# Patient Record
Sex: FEMALE | Race: WHITE | Employment: STUDENT | URBAN - METROPOLITAN AREA
[De-identification: names, ages, dates, MRNs, and addresses within clinical notes are randomized per-mention and may not be internally consistent; named-entity substitution may affect disease eponyms.]

---
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
## 2018-01-04 ENCOUNTER — OFFICE VISIT (OUTPATIENT)
Dept: FAMILY MEDICINE | Facility: CLINIC | Age: 18
End: 2018-01-04
Payer: COMMERCIAL

## 2018-01-04 VITALS
SYSTOLIC BLOOD PRESSURE: 124 MMHG | WEIGHT: 137.8 LBS | HEART RATE: 83 BPM | BODY MASS INDEX: 19.73 KG/M2 | HEIGHT: 70 IN | DIASTOLIC BLOOD PRESSURE: 83 MMHG

## 2018-01-04 DIAGNOSIS — F43.21 ADJUSTMENT DISORDER WITH DEPRESSED MOOD: Primary | ICD-10-CM

## 2018-01-04 ASSESSMENT — ENCOUNTER SYMPTOMS
NERVOUS/ANXIOUS: 1
CONSTITUTIONAL NEGATIVE: 1
INSOMNIA: 1
GASTROINTESTINAL NEGATIVE: 1
RESPIRATORY NEGATIVE: 1
DEPRESSION: 1
CARDIOVASCULAR NEGATIVE: 1
MEMORY LOSS: 0
HALLUCINATIONS: 0

## 2018-01-04 ASSESSMENT — LIFESTYLE VARIABLES: SUBSTANCE_ABUSE: 0

## 2018-01-04 NOTE — PROGRESS NOTES
HPI  In for f/u of depression and cutting.  Doing well.  Home was much better over the holidays.  Feels good getting back to school.  Connected with sports psych which has gone well.  Meds are good with no side effects.  No SI or other cutting episodes.  Did break up with boyfriend.  No other new concerns.    Review of Systems   Constitutional: Negative.    HENT: Negative.    Respiratory: Negative.    Cardiovascular: Negative.    Gastrointestinal: Negative.    Genitourinary: Negative.    Skin: Negative.    Psychiatric/Behavioral: Positive for depression. Negative for hallucinations, memory loss, substance abuse and suicidal ideas. The patient is nervous/anxious and has insomnia.          Physical Exam   Constitutional: She is oriented to person, place, and time and well-developed, well-nourished, and in no distress. No distress.   Neck: Normal range of motion. Neck supple.   Cardiovascular: Normal rate and regular rhythm.    No murmur heard.  Pulmonary/Chest: Effort normal and breath sounds normal.   Neurological: She is alert and oriented to person, place, and time.   Skin: She is not diaphoretic.   Psychiatric: Memory, affect and judgment normal.       Depression  -doing better   -still need to follow very closely on her return to school/sport  -consents to safety and plan going forward  -cont counseling and sports psych visit  -cont meds  -f/u in 1-2 weeks sooner if any concerns

## 2018-01-04 NOTE — MR AVS SNAPSHOT
"              After Visit Summary   2018    Ana Bright    MRN: [id number]           Patient Information     Date Of Birth          2000        Visit Information        Provider Department      2018 11:30 AM Bubba Brown MD [facility]        Today's Diagnoses     Adjustment disorder with depressed mood    -  1       Follow-ups after your visit        Who to contact     Please call your clinic at [phone] to:    Ask questions about your health    Make or cancel appointments    Discuss your medicines    Learn about your test results    Speak to your doctor   If you have compliments or concerns about an experience at your clinic, or if you wish to file a complaint, please contact [facility] Patient Relations at [phone] or email us at [email or web address]         Additional Information About Your Visit        MyChart Information     Filecubed is an electronic gateway that provides easy, online access to your medical records. With Filecubed, you can request a clinic appointment, read your test results, renew a prescription or communicate with your care team.     To sign up for DealCloudt visit the website at [email or web address]   You will be asked to enter the access code listed below, as well as some personal information. Please follow the directions to create your username and password.     Your access code is: [email or web address]  Expires: 2018  3:57 PM     Your access code will  in 90 days. If you need help or a new code, please contact your [facility] or call [phone] for assistance.        Care EveryWhere ID     This is your Care EveryWhere ID. This could be used by other organizations to access your [place] medical records  [id number]        Your Vitals Were     Pulse Height Last Period BMI (Body Mass Index)          83 1.778 m (5' 10\") 2017 19.77 kg/m2         Blood Pressure from " Last 3 Encounters:   01/04/18 124/83   12/14/17 118/74   11/02/17 119/71    Weight from Last 3 Encounters:   01/04/18 62.5 kg (137 lb 12.8 oz) (66 %)*   11/02/17 65.2 kg (143 lb 12.8 oz) (74 %)*   10/25/17 64.4 kg (142 lb) (72 %)*     * Growth percentiles are based on [facility] 2-20 Years data.              Today, you had the following     No orders found for display         Today's Medication Changes          These changes are accurate as of: 1/4/18  3:57 PM.  If you have any questions, ask your nurse or doctor.               Start taking these medicines.        Dose/Directions    sertraline 50 MG tablet   Commonly known as:  ZOLOFT   Used for:  Adjustment disorder with depressed mood        Dose:  50 mg   Take 1 tablet (50 mg) by mouth daily   Quantity:  30 tablet   Refills:  1            Where to get your medicines      These medications were sent to [place] [address] [place] [address]   [address] [facility] [address]     Phone:  [phone]     sertraline 50 MG tablet                Primary Care Provider    None Specified       No primary provider on file.        Equal Access to Services     [facility]: Haddione Herrera, andrews walters, en davalosalmada laine, clary conklin. So [facility] [phone].    ATENCIÓN: Si habla español, tiene a diaz disposición servicios gratuitos de asistencia lingüística. Llame al [phone].    We comply with applicable federal civil rights laws and [place] laws. We do not discriminate on the basis of race, color, national origin, age, disability, sex, sexual orientation, or gender identity.            Thank you!     Thank you for choosing [facility]  for your care. Our goal is always to provide you with excellent care. Hearing back from our patients is one way we can continue to improve our services. Please take a few minutes to complete the written survey that you may receive in the mail  after your visit with us. Thank you!             Your Updated Medication List - Protect others around you: Learn how to safely use, store and throw away your medicines at www.disposemymeds.org.          This list is accurate as of: 1/4/18  3:57 PM.  Always use your most recent med list.                   Brand Name Dispense Instructions for use Diagnosis    albuterol 108 (90 BASE) MCG/ACT Inhaler    VENTOLIN HFA    1 Inhaler    Inhale 2 puffs into the lungs every 6 hours    Moderate asthma with exacerbation, unspecified whether persistent       fluticasone-salmeterol 250-50 MCG/DOSE diskus inhaler    ADVAIR DISKUS    1 Inhaler    Inhale 1 puff into the lungs every 12 hours    Asthma exacerbation       guaiFENesin-dextromethorphan 100-10 MG/5ML syrup    ROBITUSSIN DM    560 mL    Take 5 mLs by mouth every 4 hours as needed for cough    Exercise-induced asthma, Acute bronchitis, unspecified organism       IRON SUPPLEMENT PO           predniSONE 20 MG tablet    DELTASONE    14 tablet    Take 2 tablets (40 mg) by mouth daily    Mild intermittent asthma with exacerbation       sertraline 50 MG tablet    ZOLOFT    30 tablet    Take 1 tablet (50 mg) by mouth daily    Adjustment disorder with depressed mood       Spacer/Aero Chamber Mouthpiece Misc     1 each    One spacer for use with inhaled asthma MDI    Mild intermittent asthma with exacerbation

## 2018-01-04 NOTE — LETTER
Date:January 5, 2018      Patient was self referred, no letter generated. Do not send.        [facility] Health Information

## 2018-01-04 NOTE — LETTER
1/4/2018      RE: Ana Bright  [address]  [place] [address]       HPI  In for f/u of depression and cutting.  Doing well.  Home was much better over the holidays.  Feels good getting back to school.  Connected with sports psych which has gone well.  Meds are good with no side effects.  No SI or other cutting episodes.  Did break up with boyfriend.  No other new concerns.    Review of Systems   Constitutional: Negative.    HENT: Negative.    Respiratory: Negative.    Cardiovascular: Negative.    Gastrointestinal: Negative.    Genitourinary: Negative.    Skin: Negative.    Psychiatric/Behavioral: Positive for depression. Negative for hallucinations, memory loss, substance abuse and suicidal ideas. The patient is nervous/anxious and has insomnia.          Physical Exam   Constitutional: She is oriented to person, place, and time and well-developed, well-nourished, and in no distress. No distress.   Neck: Normal range of motion. Neck supple.   Cardiovascular: Normal rate and regular rhythm.    No murmur heard.  Pulmonary/Chest: Effort normal and breath sounds normal.   Neurological: She is alert and oriented to person, place, and time.   Skin: She is not diaphoretic.   Psychiatric: Memory, affect and judgment normal.       Depression  -doing better   -still need to follow very closely on her return to school/sport  -consents to safety and plan going forward  -cont counseling and sports psych visit  -cont meds  -f/u in 1-2 weeks sooner if any concerns    Bubba Brown MD

## 2018-01-11 ENCOUNTER — OFFICE VISIT (OUTPATIENT)
Dept: FAMILY MEDICINE | Facility: CLINIC | Age: 18
End: 2018-01-11
Payer: COMMERCIAL

## 2018-01-11 VITALS
DIASTOLIC BLOOD PRESSURE: 78 MMHG | SYSTOLIC BLOOD PRESSURE: 122 MMHG | HEART RATE: 57 BPM | WEIGHT: 139.6 LBS | BODY MASS INDEX: 19.98 KG/M2 | HEIGHT: 70 IN

## 2018-01-11 DIAGNOSIS — F43.21 ADJUSTMENT DISORDER WITH DEPRESSED MOOD: Primary | ICD-10-CM

## 2018-01-11 RX ORDER — SERTRALINE HYDROCHLORIDE 100 MG/1
100 TABLET, FILM COATED ORAL DAILY
Qty: 30 TABLET | Refills: 3 | Status: SHIPPED | OUTPATIENT
Start: 2018-01-11 | End: 2020-02-05

## 2018-01-11 ASSESSMENT — ENCOUNTER SYMPTOMS
RESPIRATORY NEGATIVE: 1
DEPRESSION: 1
NERVOUS/ANXIOUS: 1
CONSTITUTIONAL NEGATIVE: 1
NEUROLOGICAL NEGATIVE: 1
INSOMNIA: 0
HALLUCINATIONS: 0
CARDIOVASCULAR NEGATIVE: 1

## 2018-01-11 ASSESSMENT — LIFESTYLE VARIABLES: SUBSTANCE_ABUSE: 0

## 2018-01-11 NOTE — PROGRESS NOTES
HPI  In for f/u.  Doing well.  Feels better overall.  Has discussed increasing her zoloft to 100 mg qd.  No side effects or concerns.  NO other issues.  No SI.    Review of Systems   Constitutional: Negative.    Respiratory: Negative.    Cardiovascular: Negative.    Neurological: Negative.    Psychiatric/Behavioral: Positive for depression. Negative for hallucinations, substance abuse and suicidal ideas. The patient is nervous/anxious. The patient does not have insomnia.          Physical Exam   Constitutional: She is oriented to person, place, and time and well-developed, well-nourished, and in no distress.   Neurological: She is alert and oriented to person, place, and time.   Skin: She is not diaphoretic.   Psychiatric: Mood, memory, affect and judgment normal.       Depression  -will increase zoloft to 100 mg qd  -cont f/u with Sports Psych, Dr. JUNIOR and us

## 2018-01-11 NOTE — LETTER
Date:January 12, 2018      Patient was self referred, no letter generated. Do not send.        [facility] Health Information

## 2018-01-11 NOTE — LETTER
1/11/2018      RE: Ana Bright  [address]  [place] [address]       HPI  In for f/u.  Doing well.  Feels better overall.  Has discussed increasing her zoloft to 100 mg qd.  No side effects or concerns.  NO other issues.  No SI.    Review of Systems   Constitutional: Negative.    Respiratory: Negative.    Cardiovascular: Negative.    Neurological: Negative.    Psychiatric/Behavioral: Positive for depression. Negative for hallucinations, substance abuse and suicidal ideas. The patient is nervous/anxious. The patient does not have insomnia.          Physical Exam   Constitutional: She is oriented to person, place, and time and well-developed, well-nourished, and in no distress.   Neurological: She is alert and oriented to person, place, and time.   Skin: She is not diaphoretic.   Psychiatric: Mood, memory, affect and judgment normal.       Depression  -will increase zoloft to 100 mg qd  -cont f/u with Sports Psych, Dr. JUNIOR and us      Bubba Brown MD

## 2018-01-11 NOTE — MR AVS SNAPSHOT
"              After Visit Summary   2018    Ana Bright    MRN: [id number]           Patient Information     Date Of Birth          2000        Visit Information        Provider Department      2018 11:15 AM Bubba Brown MD [facility]        Today's Diagnoses     Adjustment disorder with depressed mood    -  1       Follow-ups after your visit        Who to contact     Please call your clinic at [phone] to:    Ask questions about your health    Make or cancel appointments    Discuss your medicines    Learn about your test results    Speak to your doctor   If you have compliments or concerns about an experience at your clinic, or if you wish to file a complaint, please contact [facility] Patient Relations at [phone] or email us at [email or web address]         Additional Information About Your Visit        MyChart Information     [employer] is an electronic gateway that provides easy, online access to your medical records. With [employer], you can request a clinic appointment, read your test results, renew a prescription or communicate with your care team.     To sign up for Idibont visit the website at [email or web address]   You will be asked to enter the access code listed below, as well as some personal information. Please follow the directions to create your username and password.     Your access code is: [email or web address]  Expires: 2018  3:57 PM     Your access code will  in 90 days. If you need help or a new code, please contact your [facility] or call [phone] for assistance.        Care EveryWhere ID     This is your Care EveryWhere ID. This could be used by other organizations to access your [place] medical records  [id number]        Your Vitals Were     Pulse Height Last Period BMI (Body Mass Index)          57 1.778 m (5' 10\") 2017 20.03 kg/m2         Blood Pressure from " Last 3 Encounters:   01/11/18 122/78   01/04/18 124/83   12/14/17 118/74    Weight from Last 3 Encounters:   01/11/18 63.3 kg (139 lb 9.6 oz) (68 %)*   01/04/18 62.5 kg (137 lb 12.8 oz) (66 %)*   11/02/17 65.2 kg (143 lb 12.8 oz) (74 %)*     * Growth percentiles are based on [facility] 2-20 Years data.              Today, you had the following     No orders found for display         Today's Medication Changes          These changes are accurate as of: 1/11/18 11:49 AM.  If you have any questions, ask your nurse or doctor.               These medicines have changed or have updated prescriptions.        Dose/Directions    * sertraline 50 MG tablet   Commonly known as:  ZOLOFT   This may have changed:  Another medication with the same name was added. Make sure you understand how and when to take each.   Used for:  Adjustment disorder with depressed mood        Dose:  50 mg   Take 1 tablet (50 mg) by mouth daily   Quantity:  30 tablet   Refills:  1       * sertraline 100 MG tablet   Commonly known as:  ZOLOFT   This may have changed:  You were already taking a medication with the same name, and this prescription was added. Make sure you understand how and when to take each.   Used for:  Adjustment disorder with depressed mood        Dose:  100 mg   Take 1 tablet (100 mg) by mouth daily   Quantity:  30 tablet   Refills:  3       * Notice:  This list has 2 medication(s) that are the same as other medications prescribed for you. Read the directions carefully, and ask your doctor or other care provider to review them with you.         Where to get your medicines      These medications were sent to [facility] [address] [place], [place] - [address] [place] [address]  [address] [facility] [address]     Phone:  [phone]     sertraline 100 MG tablet                Primary Care Provider    None Specified       No primary provider on file.        Equal Access to Services     MICHAEL MORENO AH: andrews Molina  romeo radhazafar davalosclary newberry. So [facility] [phone].    ATENCIÓN: Si haroldo dahl, tiene a diaz disposición servicios gratuitos de asistencia lingüística. Tank al [phone].    We comply with applicable federal civil rights laws and [place] laws. We do not discriminate on the basis of race, color, national origin, age, disability, sex, sexual orientation, or gender identity.            Thank you!     Thank you for choosing [facility]  for your care. Our goal is always to provide you with excellent care. Hearing back from our patients is one way we can continue to improve our services. Please take a few minutes to complete the written survey that you may receive in the mail after your visit with us. Thank you!             Your Updated Medication List - Protect others around you: Learn how to safely use, store and throw away your medicines at www.disposemymeds.org.          This list is accurate as of: 1/11/18 11:49 AM.  Always use your most recent med list.                   Brand Name Dispense Instructions for use Diagnosis    albuterol 108 (90 BASE) MCG/ACT Inhaler    VENTOLIN HFA    1 Inhaler    Inhale 2 puffs into the lungs every 6 hours    Moderate asthma with exacerbation, unspecified whether persistent       fluticasone-salmeterol 250-50 MCG/DOSE diskus inhaler    ADVAIR DISKUS    1 Inhaler    Inhale 1 puff into the lungs every 12 hours    Asthma exacerbation       guaiFENesin-dextromethorphan 100-10 MG/5ML syrup    ROBITUSSIN DM    560 mL    Take 5 mLs by mouth every 4 hours as needed for cough    Exercise-induced asthma, Acute bronchitis, unspecified organism       IRON SUPPLEMENT PO           predniSONE 20 MG tablet    DELTASONE    14 tablet    Take 2 tablets (40 mg) by mouth daily    Mild intermittent asthma with exacerbation       * sertraline 50 MG tablet    ZOLOFT    30 tablet    Take 1 tablet (50 mg) by mouth daily    Adjustment  disorder with depressed mood       * sertraline 100 MG tablet    ZOLOFT    30 tablet    Take 1 tablet (100 mg) by mouth daily    Adjustment disorder with depressed mood       Spacer/Aero Chamber Mouthpiece Misc     1 each    One spacer for use with inhaled asthma MDI    Mild intermittent asthma with exacerbation       * Notice:  This list has 2 medication(s) that are the same as other medications prescribed for you. Read the directions carefully, and ask your doctor or other care provider to review them with you.

## 2018-01-24 ENCOUNTER — RADIANT APPOINTMENT (OUTPATIENT)
Dept: GENERAL RADIOLOGY | Facility: CLINIC | Age: 18
End: 2018-01-24
Attending: FAMILY MEDICINE
Payer: COMMERCIAL

## 2018-01-24 ENCOUNTER — OFFICE VISIT (OUTPATIENT)
Dept: ORTHOPEDICS | Facility: CLINIC | Age: 18
End: 2018-01-24
Payer: COMMERCIAL

## 2018-01-24 ENCOUNTER — RADIANT APPOINTMENT (OUTPATIENT)
Dept: MRI IMAGING | Facility: CLINIC | Age: 18
End: 2018-01-24
Attending: FAMILY MEDICINE
Payer: COMMERCIAL

## 2018-01-24 VITALS — WEIGHT: 139 LBS | HEIGHT: 70 IN | BODY MASS INDEX: 19.9 KG/M2 | RESPIRATION RATE: 16 BRPM

## 2018-01-24 DIAGNOSIS — M54.2 CERVICALGIA: ICD-10-CM

## 2018-01-24 DIAGNOSIS — M54.2 CERVICALGIA: Primary | ICD-10-CM

## 2018-01-24 NOTE — LETTER
[facility] SPORTS [facility]IC[facility]  [address] [address]  Phone: [phone]  Fax: [phone]    January 24, 2018        Aan Bright  [address] [place] [address]          To whom it may concern:    RE: Ana Bright    Patient was seen and treated today at our clinic and missed school. Given the critical nature of her issue she was required to be seen and evaluated right away.     Please contact me for questions or concerns.      Sincerely,        Josh Willingham MD  Primary Care Sports Medicine Fellow  January 24, 2018

## 2018-01-24 NOTE — LETTER
1/24/2018      RE: Ana Bright  [address]  [place] [address]        Subjective:   Ana Bright is a 17 year old female who is here complaining of neck/ upper back pain from a fall in practice on 1/23/18. She is complaining of a headache denies numbness/tining in UE.     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 She was recorded during the jump in question with visible neck hyperflexion (unable to determine if L or R load).     Per patient, she had neck pain that radiated to her upper back immediately after the fall. She was able to ambulate afterwards and then went to an ice bath for suspected muscle strain. She noted BL toe numbness and pallor s/p ice bath. Which has happened prior but this time it lasted a little bit longer.     Reports no ongoing toe numbness but does endorse upper back pain. No numbness or tingling in BL UE. No loss of strength. Gait intact and normal. No HA/dizziness/confusion.     XR obtained immediately show mild loss of cervical lordosis but no overt fracture or dislocation.     Background:   Date of injury: 1/23/18   Duration of symptoms: 1 days  Mechanism of Injury: Acute; Sports Related High jump  Aggravating factors: Neck rotation, sitting upright  Relieving Factors: rest  Prior Evaluation: None    PAST MEDICAL, SOCIAL, SURGICAL AND FAMILY HISTORY: She  has a past medical history of Uncomplicated asthma. She also has no past medical history of Arthritis; Bleeding disorder (H); Cancer (H); Cerebral infarction (H); Chronic kidney disease; Degenerative joint disease; Diabetes (H); Heart disease; Hepatitis; Hypertension; or Surgical complication.   She  has no past surgical history on file.   Her family history includes DIABETES in her maternal grandfather; Hyperlipidemia in her maternal grandmother and mother; Hypertension in her maternal grandfather and maternal grandmother.    She reports that she has never smoked. She does not have any smokeless  "tobacco history on file. She reports that she does not drink alcohol or use illicit drugs.    ALLERGIES: She has No Known Allergies.    CURRENT MEDICATIONS: She has a current medication list which includes the following prescription(s): sertraline, sertraline, spacer/aero chamber mouthpiece, albuterol, fluticasone-salmeterol, ferrous sulfate, prednisone, and guaifenesin-dextromethorphan.     REVIEW OF SYSTEMS: 3 point review of systems is negative except as noted above.     Exam:   Resp 16  Ht 5' 10\" (1.778 m)  Wt 139 lb (63 kg)  BMI 19.94 kg/m2           CONSTITUTIONAL: healthy, alert and no distress  HEAD: Normocephalic. No masses, lesions, tenderness or abnormalities  SKIN: no suspicious lesions or rashes  GAIT: normal  NEUROLOGIC: Normal muscle tone and strength, reflexes normal, sensation grossly normal., positive findings: reflexes -BR: 2+/2+, biceps: 2+/2+, triceps: 2+/2+, sensation normal along upper L back.   PSYCHIATRIC: affect normal/bright and mentation appears normal.    MUSCULOSKELETAL: C spine and T spine: TTP along C5-6 spinous processes as well as paraspinal musculature in this region. Spurlings negative.        Assessment/Plan:   18 yo F here for evaluation of lower c spine pain s/p neck hyperflexion injury during practice. XR reassuring on evaluation with no signs of acute fracture or dislocation but she does have evidence of loss of cervical lordosis. Exam today also reassuring for normal neurological exam, however, her L upper can be related to cervical nerve root injury vs muscle spasm.     Discussed that she should obtain MRI C spine today for ongoing evaluation and. Will discuss with ATC s/p MRI for further plan.     Josh Willingham MD  Primary Care Sports Medicine Fellow  January 28, 2018      Attending Note:   I have personally examined this patient and have reviewed the clinical presentation and progress note with the fellow. I agree with the treatment plan as outlined. The plan was " formulated with the fellow on the day of the patient's visit. I have reviewed all imaging with the fellow and agree with the findings in the documentation.     Farida Moreno MD, CAQ, CCD  [facility]  Sports Medicine and Bone Health    Farida Moreno MD

## 2018-01-24 NOTE — LETTER
Date:January 30, 2018      Patient was self referred, no letter generated. Do not send.        [facility] Health Information

## 2018-01-24 NOTE — PROGRESS NOTES
Subjective:   Ana Bright is a 17 year old female who is here complaining of neck/ upper back pain from a fall in practice on 1/23/18. She is complaining of a headache denies numbness/tining in UE.     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 She was recorded during the jump in question with visible neck hyperflexion (unable to determine if L or R load).     Per patient, she had neck pain that radiated to her upper back immediately after the fall. She was able to ambulate afterwards and then went to an ice bath for suspected muscle strain. She noted BL toe numbness and pallor s/p ice bath. Which has happened prior but this time it lasted a little bit longer.     Reports no ongoing toe numbness but does endorse upper back pain. No numbness or tingling in BL UE. No loss of strength. Gait intact and normal. No HA/dizziness/confusion.     XR obtained immediately show mild loss of cervical lordosis but no overt fracture or dislocation.     Background:   Date of injury: 1/23/18   Duration of symptoms: 1 days  Mechanism of Injury: Acute; Sports Related High jump  Aggravating factors: Neck rotation, sitting upright  Relieving Factors: rest  Prior Evaluation: None    PAST MEDICAL, SOCIAL, SURGICAL AND FAMILY HISTORY: She  has a past medical history of Uncomplicated asthma. She also has no past medical history of Arthritis; Bleeding disorder (H); Cancer (H); Cerebral infarction (H); Chronic kidney disease; Degenerative joint disease; Diabetes (H); Heart disease; Hepatitis; Hypertension; or Surgical complication.   She  has no past surgical history on file.   Her family history includes DIABETES in her maternal grandfather; Hyperlipidemia in her maternal grandmother and mother; Hypertension in her maternal grandfather and maternal grandmother.    She reports that she has never smoked. She does not have any smokeless tobacco history on file. She reports that she does not drink alcohol or use illicit  "drugs.    ALLERGIES: She has No Known Allergies.    CURRENT MEDICATIONS: She has a current medication list which includes the following prescription(s): sertraline, sertraline, spacer/aero chamber mouthpiece, albuterol, fluticasone-salmeterol, ferrous sulfate, prednisone, and guaifenesin-dextromethorphan.     REVIEW OF SYSTEMS: 3 point review of systems is negative except as noted above.     Exam:   Resp 16  Ht 5' 10\" (1.778 m)  Wt 139 lb (63 kg)  BMI 19.94 kg/m2           CONSTITUTIONAL: healthy, alert and no distress  HEAD: Normocephalic. No masses, lesions, tenderness or abnormalities  SKIN: no suspicious lesions or rashes  GAIT: normal  NEUROLOGIC: Normal muscle tone and strength, reflexes normal, sensation grossly normal., positive findings: reflexes -BR: 2+/2+, biceps: 2+/2+, triceps: 2+/2+, sensation normal along upper L back.   PSYCHIATRIC: affect normal/bright and mentation appears normal.    MUSCULOSKELETAL: C spine and T spine: TTP along C5-6 spinous processes as well as paraspinal musculature in this region. Spurlings negative.        Assessment/Plan:   18 yo F here for evaluation of lower c spine pain s/p neck hyperflexion injury during practice. XR reassuring on evaluation with no signs of acute fracture or dislocation but she does have evidence of loss of cervical lordosis. Exam today also reassuring for normal neurological exam, however, her L upper can be related to cervical nerve root injury vs muscle spasm.     Discussed that she should obtain MRI C spine today for ongoing evaluation and. Will discuss with ATC s/p MRI for further plan.     Josh Willingham MD  Primary Care Sports Medicine Fellow  January 28, 2018    "

## 2018-01-24 NOTE — MR AVS SNAPSHOT
"              After Visit Summary   2018    Ana Bright    MRN: [id number]           Patient Information     Date Of Birth          2000        Visit Information        Provider Department      2018 4:40 PM Farida Moreno MD [facility] Sports Medicine        Today's Diagnoses     Cervicalgia    -  1       Follow-ups after your visit        Who to contact     Please call your clinic at [phone] to:    Ask questions about your health    Make or cancel appointments    Discuss your medicines    Learn about your test results    Speak to your doctor   If you have compliments or concerns about an experience at your clinic, or if you wish to file a complaint, please contact [facility] Patient Relations at [phone] or email us at [email or web address]         Additional Information About Your Visit        MyChart Information     The [employer] is an electronic gateway that provides easy, online access to your medical records. With [employer], you can request a clinic appointment, read your test results, renew a prescription or communicate with your care team.     To sign up for [employer] visit the website at [email or web address]   You will be asked to enter the access code listed below, as well as some personal information. Please follow the directions to create your username and password.     Your access code is: [email or web address]  Expires: 2018  3:57 PM     Your access code will  in 90 days. If you need help or a new code, please contact your [facility] or call [phone] for assistance.        Care EveryWhere ID     This is your Care EveryWhere ID. This could be used by other organizations to access your [place] medical records  [id number]        Your Vitals Were     Respirations Height BMI (Body Mass Index)             16 5' 10\" (1.778 m) 19.94 kg/m2          Blood Pressure from Last 3 Encounters:   18 " 122/78   01/04/18 124/83   12/14/17 118/74    Weight from Last 3 Encounters:   01/24/18 139 lb (63 kg) (68 %)*   01/11/18 139 lb 9.6 oz (63.3 kg) (68 %)*   01/04/18 137 lb 12.8 oz (62.5 kg) (66 %)*     * Growth percentiles are based on [facility] 2-20 Years data.               Primary Care Provider Fax #    Physician No Ref-Primary [phone]       No address on file        Equal Access to Services     JAKE MORENO : Haddione jameson Soshereen, waaxda luqadaha, qaybta kaalmada laine, clary walden . So [facility] [phone].    ATENCIÓN: Si habla español, tiene a diaz disposición servicios gratuitos de asistencia lingüística. Llame al [phone].    We comply with applicable federal civil rights laws and [place] laws. We do not discriminate on the basis of race, color, national origin, age, disability, sex, sexual orientation, or gender identity.            Thank you!     Thank you for choosing [facility]  for your care. Our goal is always to provide you with excellent care. Hearing back from our patients is one way we can continue to improve our services. Please take a few minutes to complete the written survey that you may receive in the mail after your visit with us. Thank you!             Your Updated Medication List - Protect others around you: Learn how to safely use, store and throw away your medicines at www.disposemymeds.org.          This list is accurate as of 1/24/18 11:59 PM.  Always use your most recent med list.                   Brand Name Dispense Instructions for use Diagnosis    albuterol 108 (90 BASE) MCG/ACT Inhaler    VENTOLIN HFA    1 Inhaler    Inhale 2 puffs into the lungs every 6 hours    Moderate asthma with exacerbation, unspecified whether persistent       fluticasone-salmeterol 250-50 MCG/DOSE diskus inhaler    ADVAIR DISKUS    1 Inhaler    Inhale 1 puff into the lungs every 12 hours    Asthma exacerbation       guaiFENesin-dextromethorphan  100-10 MG/5ML syrup    ROBITUSSIN DM    560 mL    Take 5 mLs by mouth every 4 hours as needed for cough    Exercise-induced asthma, Acute bronchitis, unspecified organism       IRON SUPPLEMENT PO           predniSONE 20 MG tablet    DELTASONE    14 tablet    Take 2 tablets (40 mg) by mouth daily    Mild intermittent asthma with exacerbation       * sertraline 50 MG tablet    ZOLOFT    30 tablet    Take 1 tablet (50 mg) by mouth daily    Adjustment disorder with depressed mood       * sertraline 100 MG tablet    ZOLOFT    30 tablet    Take 1 tablet (100 mg) by mouth daily    Adjustment disorder with depressed mood       Spacer/Aero Chamber Mouthpiece Misc     1 each    One spacer for use with inhaled asthma MDI    Mild intermittent asthma with exacerbation       * Notice:  This list has 2 medication(s) that are the same as other medications prescribed for you. Read the directions carefully, and ask your doctor or other care provider to review them with you.

## 2018-01-24 NOTE — PROGRESS NOTES
Attending Note:   I have personally examined this patient and have reviewed the clinical presentation and progress note with the fellow. I agree with the treatment plan as outlined. The plan was formulated with the fellow on the day of the patient's visit. I have reviewed all imaging with the fellow and agree with the findings in the documentation.     Farida Moreno MD, CAQ, CCD  [facility]  Sports Medicine and Bone Health

## 2018-02-08 ENCOUNTER — OFFICE VISIT (OUTPATIENT)
Dept: FAMILY MEDICINE | Facility: CLINIC | Age: 18
End: 2018-02-08
Payer: COMMERCIAL

## 2018-02-08 VITALS
BODY MASS INDEX: 19.92 KG/M2 | HEART RATE: 68 BPM | WEIGHT: 138.8 LBS | DIASTOLIC BLOOD PRESSURE: 85 MMHG | SYSTOLIC BLOOD PRESSURE: 126 MMHG

## 2018-02-08 DIAGNOSIS — F43.22 ADJUSTMENT DISORDER WITH ANXIOUS MOOD: Primary | ICD-10-CM

## 2018-02-08 NOTE — MR AVS SNAPSHOT
After Visit Summary   2018    Ana Bright    MRN: [id number]           Patient Information     Date Of Birth          2000        Visit Information        Provider Department      2018 11:15 AM Bubba Brown MD [facility]        Today's Diagnoses     Adjustment disorder with anxious mood    -  1       Follow-ups after your visit        Who to contact     Please call your clinic at [phone] to:    Ask questions about your health    Make or cancel appointments    Discuss your medicines    Learn about your test results    Speak to your doctor            Additional Information About Your Visit        MyChart Information     [employer] is an electronic gateway that provides easy, online access to your medical records. With [employer], you can request a clinic appointment, read your test results, renew a prescription or communicate with your care team.     To sign up for [employer] visit the website at [email or web address]   You will be asked to enter the access code listed below, as well as some personal information. Please follow the directions to create your username and password.     Your access code is: [email or web address]  Expires: 2018  3:57 PM     Your access code will  in 90 days. If you need help or a new code, please contact your [facility] or call [phone] for assistance.        Care EveryWhere ID     This is your Care EveryWhere ID. This could be used by other organizations to access your [facility] medical records  [id number]        Your Vitals Were     Pulse Last Period BMI (Body Mass Index)             68 2017 (Approximate) 19.92 kg/m2          Blood Pressure from Last 3 Encounters:   02/15/18 124/89   18 126/85   18 122/78    Weight from Last 3 Encounters:   02/15/18 63 kg (138 lb 12.8 oz) (67 %)*   18 63 kg (138 lb 12.8 oz) (67 %)*   18 63 kg (139 lb) (68 %)*     * Growth percentiles  are based on [facility] 2-20 Years data.              Today, you had the following     No orders found for display       Primary Care Provider Fax #    Physician No Ref-Primary [phone]       No address on file        Equal Access to Services     NATHALIAJAKE JOSH : Carlo acosta cyr gisell Herrera, andrews walters, en kamuna jang, clary celayaclemencia katerin. So [facility] [phone].    ATENCIÓN: Si habla español, tiene a diaz disposición servicios gratuitos de asistencia lingüística. Llame al [phone].    We comply with applicable federal civil rights laws and [place] laws. We do not discriminate on the basis of race, color, national origin, age, disability, sex, sexual orientation, or gender identity.            Thank you!     Thank you for choosing [facility]TH[facility]  for your care. Our goal is always to provide you with excellent care. Hearing back from our patients is one way we can continue to improve our services. Please take a few minutes to complete the written survey that you may receive in the mail after your visit with us. Thank you!             Your Updated Medication List - Protect others around you: Learn how to safely use, store and throw away your medicines at www.disposemymeds.org.          This list is accurate as of 2/8/18 11:59 PM.  Always use your most recent med list.                   Brand Name Dispense Instructions for use Diagnosis    albuterol 108 (90 BASE) MCG/ACT Inhaler    VENTOLIN HFA    1 Inhaler    Inhale 2 puffs into the lungs every 6 hours    Moderate asthma with exacerbation, unspecified whether persistent       fluticasone-salmeterol 250-50 MCG/DOSE diskus inhaler    ADVAIR DISKUS    1 Inhaler    Inhale 1 puff into the lungs every 12 hours    Asthma exacerbation       guaiFENesin-dextromethorphan 100-10 MG/5ML syrup    ROBITUSSIN DM    560 mL    Take 5 mLs by mouth every 4 hours as needed for cough    Exercise-induced asthma, Acute bronchitis,  unspecified organism       IRON SUPPLEMENT PO           predniSONE 20 MG tablet    DELTASONE    14 tablet    Take 2 tablets (40 mg) by mouth daily    Mild intermittent asthma with exacerbation       * sertraline 50 MG tablet    ZOLOFT    30 tablet    Take 1 tablet (50 mg) by mouth daily    Adjustment disorder with depressed mood       * sertraline 100 MG tablet    ZOLOFT    30 tablet    Take 1 tablet (100 mg) by mouth daily    Adjustment disorder with depressed mood       Spacer/Aero Chamber Mouthpiece Misc     1 each    One spacer for use with inhaled asthma MDI    Mild intermittent asthma with exacerbation       * Notice:  This list has 2 medication(s) that are the same as other medications prescribed for you. Read the directions carefully, and ask your doctor or other care provider to review them with you.

## 2018-02-08 NOTE — LETTER
Date:February 16, 2018      Patient was self referred, no letter generated. Do not send.        [facility] Physicians Health Information

## 2018-02-08 NOTE — PROGRESS NOTES
HPI  In for f/u of depression and anxiety.  Some stress with class but otherwise doing much better.  Doing well with the increased dose of zoloft to 100mg qd.  LA in her last meet. She did have a iesha that was stalking her on social media and at the meet as well.  Made her more anxious.  Nothing threatening just very creepy to her.    Review of Systems   Constitutional: Negative.    HENT: Negative.    Neurological: Negative.    Psychiatric/Behavioral: Positive for depression. The patient is nervous/anxious.          Physical Exam   Constitutional: She is oriented to person, place, and time and well-developed, well-nourished, and in no distress.   HENT:   Head: Atraumatic.   Neurological: She is alert and oriented to person, place, and time.   Psychiatric: Memory, affect and judgment normal.       Anxiety  -long discussion on a stalker at her last meet.  We will be notifying our Admin and working with [place] police to get this taken care of.  -cont current meds  -f/u next week

## 2018-02-08 NOTE — LETTER
2/8/2018      RE: Ana Bright  [address]  [place] [address]       HPI  In for f/u of depression and anxiety.  Some stress with class but otherwise doing much better.  Doing well with the increased dose of zoloft to 100mg qd.  IA in her last meet. She did have a iesha that was stalking her on social media and at the meet as well.  Made her more anxious.  Nothing threatening just very creepy to her.    Review of Systems   Constitutional: Negative.    HENT: Negative.    Neurological: Negative.    Psychiatric/Behavioral: Positive for depression. The patient is nervous/anxious.          Physical Exam   Constitutional: She is oriented to person, place, and time and well-developed, well-nourished, and in no distress.   HENT:   Head: Atraumatic.   Neurological: She is alert and oriented to person, place, and time.   Psychiatric: Memory, affect and judgment normal.       Anxiety  -long discussion on a stalker at her last meet.  We will be notifying our Admin and working with [place] police to get this taken care of.  -cont current meds  -f/u next week    Bubba Brown MD

## 2018-02-15 ENCOUNTER — OFFICE VISIT (OUTPATIENT)
Dept: FAMILY MEDICINE | Facility: CLINIC | Age: 18
End: 2018-02-15
Payer: COMMERCIAL

## 2018-02-15 VITALS
SYSTOLIC BLOOD PRESSURE: 124 MMHG | DIASTOLIC BLOOD PRESSURE: 89 MMHG | HEIGHT: 70 IN | HEART RATE: 91 BPM | BODY MASS INDEX: 19.87 KG/M2 | WEIGHT: 138.8 LBS

## 2018-02-15 DIAGNOSIS — F43.22 ADJUSTMENT DISORDER WITH ANXIOUS MOOD: Primary | ICD-10-CM

## 2018-02-15 ASSESSMENT — ENCOUNTER SYMPTOMS
DEPRESSION: 1
NERVOUS/ANXIOUS: 1
HALLUCINATIONS: 0
NEUROLOGICAL NEGATIVE: 1
INSOMNIA: 0
NEUROLOGICAL NEGATIVE: 1
RESPIRATORY NEGATIVE: 1
DEPRESSION: 1
MEMORY LOSS: 0
NERVOUS/ANXIOUS: 1
CARDIOVASCULAR NEGATIVE: 1
CONSTITUTIONAL NEGATIVE: 1
CONSTITUTIONAL NEGATIVE: 1

## 2018-02-15 ASSESSMENT — LIFESTYLE VARIABLES: SUBSTANCE_ABUSE: 0

## 2018-02-15 NOTE — LETTER
2/15/2018      RE: Ana Bright  [address]  [place] [address]       HPI  In for f/u of anxiety.  Has a stalker issue that has been handled by [employer] [place] police.  Things are much better with that situation.    Review of Systems   Constitutional: Negative.    HENT: Negative.    Respiratory: Negative.    Cardiovascular: Negative.    Neurological: Negative.    Psychiatric/Behavioral: Positive for depression. Negative for hallucinations, memory loss, substance abuse and suicidal ideas. The patient is nervous/anxious. The patient does not have insomnia.          Physical Exam   Constitutional: She is well-developed, well-nourished, and in no distress.   HENT:   Head: Normocephalic and atraumatic.   Right Ear: External ear normal.   Left Ear: External ear normal.   Mouth/Throat: Oropharynx is clear and moist.   Skin: She is not diaphoretic.   Psychiatric: Memory, affect and judgment normal.       Anxiety  -much better after having the situation handled as well as it has been  -will follow closely    Bubba Brown MD

## 2018-02-15 NOTE — PROGRESS NOTES
HPI  In for f/u of anxiety.  Has a stalker issue that has been handled by [employer] [place] police.  Things are much better with that situation.    Review of Systems   Constitutional: Negative.    HENT: Negative.    Respiratory: Negative.    Cardiovascular: Negative.    Neurological: Negative.    Psychiatric/Behavioral: Positive for depression. Negative for hallucinations, memory loss, substance abuse and suicidal ideas. The patient is nervous/anxious. The patient does not have insomnia.          Physical Exam   Constitutional: She is well-developed, well-nourished, and in no distress.   HENT:   Head: Normocephalic and atraumatic.   Right Ear: External ear normal.   Left Ear: External ear normal.   Mouth/Throat: Oropharynx is clear and moist.   Skin: She is not diaphoretic.   Psychiatric: Memory, affect and judgment normal.       Anxiety  -much better after having the situation handled as well as it has been  -will follow closely

## 2018-02-15 NOTE — LETTER
Date:February 16, 2018      Patient was self referred, no letter generated. Do not send.        [facility] Physicians Health Information

## 2018-02-15 NOTE — MR AVS SNAPSHOT
"              After Visit Summary   2/15/2018    Ana Bright    MRN: [id number]           Patient Information     Date Of Birth          2000        Visit Information        Provider Department      2/15/2018 5:00 PM Bubba Brown MD [facility]        Today's Diagnoses     Adjustment disorder with anxious mood    -  1       Follow-ups after your visit        Who to contact     Please call your clinic at [phone] to:    Ask questions about your health    Make or cancel appointments    Discuss your medicines    Learn about your test results    Speak to your doctor            Additional Information About Your Visit        MyChart Information     [employer] is an electronic gateway that provides easy, online access to your medical records. With [employer], you can request a clinic appointment, read your test results, renew a prescription or communicate with your care team.     To sign up for [email or web address] visit the website at [email or web address]   You will be asked to enter the access code listed below, as well as some personal information. Please follow the directions to create your username and password.     Your access code is: [email or web address]  Expires: 2018  3:57 PM     Your access code will  in 90 days. If you need help or a new code, please contact your [facility] or call [phone] for assistance.        Care EveryWhere ID     This is your Care EveryWhere ID. This could be used by other organizations to access your [facility] medical records  [id number]        Your Vitals Were     Pulse Height Last Period BMI (Body Mass Index)          91 1.778 m (5' 10\") 2017 (Approximate) 19.92 kg/m2         Blood Pressure from Last 3 Encounters:   02/15/18 124/89   18 126/85   18 122/78    Weight from Last 3 Encounters:   02/15/18 63 kg (138 lb 12.8 oz) (67 %)*   18 63 kg (138 lb 12.8 oz) (67 %)*   18 63 kg (139 lb) (68 %)* "     * Growth percentiles are based on [facility] 2-20 Years data.              Today, you had the following     No orders found for display       Primary Care Provider Fax #    Physician No Ref-Primary [phone]       No address on file        Equal Access to Services     NATHALIAJAKE JOSH : Carlo shane ger gisell Herrera, wabeau luqadaha, en kaalevan jang, clary celayaclemencia conklin. So [facility] [phone].    ATENCIÓN: Si habla español, tiene a diaz disposición servicios gratuitos de asistencia lingüística. Llame al [phone].    We comply with applicable federal civil rights laws and [place] laws. We do not discriminate on the basis of race, color, national origin, age, disability, sex, sexual orientation, or gender identity.            Thank you!     Thank you for choosing [facility] ATH[facility]  for your care. Our goal is always to provide you with excellent care. Hearing back from our patients is one way we can continue to improve our services. Please take a few minutes to complete the written survey that you may receive in the mail after your visit with us. Thank you!             Your Updated Medication List - Protect others around you: Learn how to safely use, store and throw away your medicines at www.disposemymeds.org.          This list is accurate as of 2/15/18  5:02 PM.  Always use your most recent med list.                   Brand Name Dispense Instructions for use Diagnosis    albuterol 108 (90 BASE) MCG/ACT Inhaler    VENTOLIN HFA    1 Inhaler    Inhale 2 puffs into the lungs every 6 hours    Moderate asthma with exacerbation, unspecified whether persistent       fluticasone-salmeterol 250-50 MCG/DOSE diskus inhaler    ADVAIR DISKUS    1 Inhaler    Inhale 1 puff into the lungs every 12 hours    Asthma exacerbation       guaiFENesin-dextromethorphan 100-10 MG/5ML syrup    ROBITUSSIN DM    560 mL    Take 5 mLs by mouth every 4 hours as needed for cough    Exercise-induced  asthma, Acute bronchitis, unspecified organism       IRON SUPPLEMENT PO           predniSONE 20 MG tablet    DELTASONE    14 tablet    Take 2 tablets (40 mg) by mouth daily    Mild intermittent asthma with exacerbation       * sertraline 50 MG tablet    ZOLOFT    30 tablet    Take 1 tablet (50 mg) by mouth daily    Adjustment disorder with depressed mood       * sertraline 100 MG tablet    ZOLOFT    30 tablet    Take 1 tablet (100 mg) by mouth daily    Adjustment disorder with depressed mood       Spacer/Aero Chamber Mouthpiece Misc     1 each    One spacer for use with inhaled asthma MDI    Mild intermittent asthma with exacerbation       * Notice:  This list has 2 medication(s) that are the same as other medications prescribed for you. Read the directions carefully, and ask your doctor or other care provider to review them with you.

## 2018-03-05 DIAGNOSIS — M84.361A STRESS FRACTURE OF RIGHT TIBIA: Primary | ICD-10-CM

## 2018-03-07 ENCOUNTER — RADIANT APPOINTMENT (OUTPATIENT)
Dept: MRI IMAGING | Facility: CLINIC | Age: 18
End: 2018-03-07
Attending: FAMILY MEDICINE
Payer: COMMERCIAL

## 2018-03-07 DIAGNOSIS — M84.361A STRESS FRACTURE OF RIGHT TIBIA: ICD-10-CM

## 2018-03-08 ENCOUNTER — OFFICE VISIT (OUTPATIENT)
Dept: FAMILY MEDICINE | Facility: CLINIC | Age: 18
End: 2018-03-08
Payer: COMMERCIAL

## 2018-03-08 VITALS
WEIGHT: 142.2 LBS | HEART RATE: 66 BPM | DIASTOLIC BLOOD PRESSURE: 81 MMHG | BODY MASS INDEX: 20.36 KG/M2 | HEIGHT: 70 IN | SYSTOLIC BLOOD PRESSURE: 118 MMHG
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
## 2018-03-08 NOTE — MR AVS SNAPSHOT
"              After Visit Summary   3/8/2018    Ana Bright    MRN: [id number]           Patient Information     Date Of Birth          2000        Visit Information        Provider Department      3/8/2018 11:30 AM Bubba Brown MD [facility]        Today's Diagnoses     Repetitive stress injury    -  1       Follow-ups after your visit        Who to contact     Please call your clinic at [phone] to:    Ask questions about your health    Make or cancel appointments    Discuss your medicines    Learn about your test results    Speak to your doctor            Additional Information About Your Visit        MyChart Information     [employer] is an electronic gateway that provides easy, online access to your medical records. With [employer], you can request a clinic appointment, read your test results, renew a prescription or communicate with your care team.     To sign up for [employer] visit the website at [email or web address]   You will be asked to enter the access code listed below, as well as some personal information. Please follow the directions to create your username and password.     Your access code is: [email or web address]  Expires: 2018  3:57 PM     Your access code will  in 90 days. If you need help or a new code, please contact your [facility] or call [phone] for assistance.        Care EveryWhere ID     This is your Care EveryWhere ID. This could be used by other organizations to access your [facility] medical records  [id number]        Your Vitals Were     Pulse Height Last Period BMI (Body Mass Index)          66 1.778 m (5' 10\") 2018 20.4 kg/m2         Blood Pressure from Last 3 Encounters:   18 118/81   02/15/18 124/89   18 126/85    Weight from Last 3 Encounters:   18 64.5 kg (142 lb 3.2 oz)   02/15/18 63 kg (138 lb 12.8 oz) (67 %)*   18 63 kg (138 lb 12.8 oz) (67 %)*     * Growth percentiles are " based on CDC 2-20 Years data.              Today, you had the following     No orders found for display       Primary Care Provider Fax #    Physician No Ref-Primary [phone]       No address on file        Equal Access to Services     NATHALIAJAKE JOSH : Carlo aad ku haddonna Herrera, andrews walters, en kamuna jang, clary brizuela laYayajacobo conklin. So [facility] [phone].    ATENCIÓN: Si habla español, tiene a diaz disposición servicios gratuitos de asistencia lingüística. Llame al [phone].    We comply with applicable federal civil rights laws and [place] laws. We do not discriminate on the basis of race, color, national origin, age, disability, sex, sexual orientation, or gender identity.            Thank you!     Thank you for choosing [facility]IC [facility]  for your care. Our goal is always to provide you with excellent care. Hearing back from our patients is one way we can continue to improve our services. Please take a few minutes to complete the written survey that you may receive in the mail after your visit with us. Thank you!             Your Updated Medication List - Protect others around you: Learn how to safely use, store and throw away your medicines at www.disposemymeds.org.          This list is accurate as of 3/8/18 12:50 PM.  Always use your most recent med list.                   Brand Name Dispense Instructions for use Diagnosis    albuterol 108 (90 BASE) MCG/ACT Inhaler    VENTOLIN HFA    1 Inhaler    Inhale 2 puffs into the lungs every 6 hours    Moderate asthma with exacerbation, unspecified whether persistent       fluticasone-salmeterol 250-50 MCG/DOSE diskus inhaler    ADVAIR DISKUS    1 Inhaler    Inhale 1 puff into the lungs every 12 hours    Asthma exacerbation       guaiFENesin-dextromethorphan 100-10 MG/5ML syrup    ROBITUSSIN DM    560 mL    Take 5 mLs by mouth every 4 hours as needed for cough    Exercise-induced asthma, Acute bronchitis,  unspecified organism       IRON SUPPLEMENT PO           predniSONE 20 MG tablet    DELTASONE    14 tablet    Take 2 tablets (40 mg) by mouth daily    Mild intermittent asthma with exacerbation       * sertraline 50 MG tablet    ZOLOFT    30 tablet    Take 1 tablet (50 mg) by mouth daily    Adjustment disorder with depressed mood       * sertraline 100 MG tablet    ZOLOFT    30 tablet    Take 1 tablet (100 mg) by mouth daily    Adjustment disorder with depressed mood       Spacer/Aero Chamber Mouthpiece Misc     1 each    One spacer for use with inhaled asthma MDI    Mild intermittent asthma with exacerbation       * Notice:  This list has 2 medication(s) that are the same as other medications prescribed for you. Read the directions carefully, and ask your doctor or other care provider to review them with you.

## 2018-03-08 NOTE — LETTER
3/8/2018      RE: Ana Bright  [address] [place] [address]       In for discussion of MRI results.  Has had R shin pain for awhile now.  Nothing that felt very bad for her.  Very down about having stress reactions.  In a boot for the R side which helps a lot.  No pain in shoe on left.  Hx of foot stress injury.  SOme concerns with diet and restriction of intake.  Seeing Jolanta for this.  Also a lot of stress and anxiety recently with events this winter.      IMPRESSION:   1. Right tibia: Fredericson medial tibial stress syndrome with grade 3  changes involving the right mid shaft of the tibia. These findings  include edema superficial and deep to the midshaft of the cortex. No  intracortical involvement.  2. Left tibia: Fredericson medial tibial stress syndrome  classification grade 2 changes involving the left mid shaft of the  tibia.     Reviewed the above findings  Boot on R side.  Rest through spring break  COnt focus on nutrition and fueling  With all going on I believe a group meeting would be good to discuss all of her ongoing issues.  Will start arranging a date for this.    Bubba Brown MD

## 2018-03-08 NOTE — LETTER
Date:March 9, 2018      Patient was self referred, no letter generated. Do not send.        [facility] Physicians Health Information

## 2018-03-08 NOTE — PROGRESS NOTES
In for discussion of MRI results.  Has had R shin pain for awhile now.  Nothing that felt very bad for her.  Very down about having stress reactions.  In a boot for the R side which helps a lot.  No pain in shoe on left.  Hx of foot stress injury.  SOme concerns with diet and restriction of intake.  Seeing Jolanta for this.  Also a lot of stress and anxiety recently with events this winter.      IMPRESSION:   1. Right tibia: Fredericson medial tibial stress syndrome with grade 3  changes involving the right mid shaft of the tibia. These findings  include edema superficial and deep to the midshaft of the cortex. No  intracortical involvement.  2. Left tibia: Fredericson medial tibial stress syndrome  classification grade 2 changes involving the left mid shaft of the  tibia.     Reviewed the above findings  Boot on R side.  Rest through spring break  COnt focus on nutrition and fueling  With all going on I believe a group meeting would be good to discuss all of her ongoing issues.  Will start arranging a date for this.

## 2018-03-27 ENCOUNTER — OFFICE VISIT (OUTPATIENT)
Dept: FAMILY MEDICINE | Facility: CLINIC | Age: 18
End: 2018-03-27
Payer: COMMERCIAL

## 2018-03-27 VITALS
BODY MASS INDEX: 20.7 KG/M2 | WEIGHT: 144.6 LBS | HEIGHT: 70 IN | HEART RATE: 83 BPM | SYSTOLIC BLOOD PRESSURE: 121 MMHG | DIASTOLIC BLOOD PRESSURE: 81 MMHG
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
## 2018-03-27 DIAGNOSIS — F41.1 GAD (GENERALIZED ANXIETY DISORDER): ICD-10-CM

## 2018-03-27 NOTE — PROGRESS NOTES
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     -No running since [place].  Boot for 3 weeks for R side and out of it yesterday.  Used crutches at the beginning as well as the boot.  2 weeks ago off the crutches.  March 12 was the last time it hurt her.      -Metatarsal stress fracture and lumbar stress fracture in HS      ROSENDO:  Zoloft 100mg is working well for her.  Increased dose mid Jan.  No side effects.  Seeing Dr. Mary Christie, Sports Psychologist every two weeks.     Nutirition:  Seeing Jolanta Juancho.  Workng on having better lunches.  She had lost 10-15 lbs Dec, Isaac, Feb.      Great Grandmother:  Osteoporosis    Current Outpatient Prescriptions   Medication     sertraline (ZOLOFT) 100 MG tablet     sertraline (ZOLOFT) 50 MG tablet     predniSONE (DELTASONE) 20 MG tablet     Spacer/Aero Chamber Mouthpiece MISC     albuterol (VENTOLIN HFA) 108 (90 BASE) MCG/ACT Inhaler     fluticasone-salmeterol (ADVAIR DISKUS) 250-50 MCG/DOSE diskus inhaler     Ferrous Sulfate (IRON SUPPLEMENT PO)     guaiFENesin-dextromethorphan (ROBITUSSIN DM) 100-10 MG/5ML syrup     No current facility-administered medications for this visit.          O:  NAD  /81  Pulse 83  Ht 1.778 m (5' 10\")  Wt 65.6 kg (144 lb 9.6 oz)  LMP 02/24/2018 (Exact Date)  BMI 20.75 kg/m2    L tibia:  nttp or minor discomfort. R tibia:  Mild ttp    Single leg hopping painfree now bilaterally.         A:  Bilateral tibial stress reactions:  R grade 3 and Left grade 2:  Improving nicely.     1)  Tibial stress reaction:  Improving.  Ok to start elliptical but no impact training at this time.  RTC in 2-3 weeks to advance.   2) ROSENDO:  Doing well on Zoloft 100mg qday.  Continue counseling with Psychology. Recheck prior to the end of the semester.       > 25 min of total time spent in one-on-one evalution and discussion with patient regarding nature of problem, course, prior treatments, and " therapeutic options,> 50% of which was spent in counseling and coordination of care:    Farida Moreno MD, CAQ, FACSM, CCD  [facility]  Sports Medicine and Bone Health  Team Physician;  Athletics

## 2018-03-27 NOTE — LETTER
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     -No running since [place].  Boot for 3 weeks for R side and out of it yesterday.  Used crutches at the beginning as well as the boot.  2 weeks ago off the crutches.  March 12 was the last time it hurt her.      -Metatarsal stress fracture and lumbar stress fracture in HS      ROSENDO:  Zoloft 100mg is working well for her.  Increased dose mid Jan.  No side effects.  Seeing Dr. Mary Christie, Sports Psychologist every two weeks.     Nutirition:  Seeing Jolanta Juancho.  Workng on having better lunches.  She had lost 10-15 lbs Dec, Jan, Feb.      Great Grandmother:  Osteoporosis    Current Outpatient Prescriptions   Medication     sertraline (ZOLOFT) 100 MG tablet     sertraline (ZOLOFT) 50 MG tablet     predniSONE (DELTASONE) 20 MG tablet     Spacer/Aero Chamber Mouthpiece MISC     albuterol (VENTOLIN HFA) 108 (90 BASE) MCG/ACT Inhaler     fluticasone-salmeterol (ADVAIR DISKUS) 250-50 MCG/DOSE diskus inhaler     Ferrous Sulfate (IRON SUPPLEMENT PO)     guaiFENesin-dextromethorphan (ROBITUSSIN DM) 100-10 MG/5ML syrup     No current facility-administered medications for this visit.          O:  NAD  /81  Pulse 83  Ht 1.778 m (5' 10\")  Wt 65.6 kg (144 lb 9.6 oz)  LMP 02/24/2018 (Exact Date)  BMI 20.75 kg/m2    L tibia:  nttp or minor discomfort. R tibia:  Mild ttp    Single leg hopping painfree now bilaterally.         A:  Bilateral tibial stress reactions:  R grade 3 and Left grade 2:  Improving nicely.     1)  Tibial stress reaction:  Improving.  Ok to start elliptical but no impact training at this time.  RTC in 2-3 weeks to advance.   2) ROSENDO:  Doing well on Zoloft 100mg qday.  Continue counseling with Psychology. Recheck prior to the end of the semester.       > 25 min of total time spent in one-on-one evalution and " discussion with patient regarding nature of problem, course, prior treatments, and therapeutic options,> 50% of which was spent in counseling and coordination of care:    Farida Moreno MD, CAQ, FACSM, CCD  [facility]  Sports Medicine and Bone Health  Team Physician;  Athletics        Farida Moreno MD

## 2018-03-27 NOTE — LETTER
Date:April 25, 2018      Patient was self referred, no letter generated. Do not send.        [facility] Health Information

## 2018-03-27 NOTE — MR AVS SNAPSHOT
"              After Visit Summary   3/27/2018    Ana Bright    MRN: [id number]           Patient Information     Date Of Birth          2000        Visit Information        Provider Department      3/27/2018 3:15 PM Farida Moreno MD [facility]        Today's Diagnoses     Repetitive stress injury    -  1    ROSENDO (generalized anxiety disorder)           Follow-ups after your visit        Who to contact     Please call your clinic at [phone] to:    Ask questions about your health    Make or cancel appointments    Discuss your medicines    Learn about your test results    Speak to your doctor            Additional Information About Your Visit        MyChart Information     [employer] is an electronic gateway that provides easy, online access to your medical records. With [employer], you can request a clinic appointment, read your test results, renew a prescription or communicate with your care team.     To sign up for [employer] visit the website at [email or web address]   You will be asked to enter the access code listed below, as well as some personal information. Please follow the directions to create your username and password.     Your access code is: [email or web address]  Expires: 2018  6:31 AM     Your access code will  in 90 days. If you need help or a new code, please contact your [facility] or call [phone] for assistance.        Care EveryWhere ID     This is your Care EveryWhere ID. This could be used by other organizations to access your [facility] medical records  [id number]        Your Vitals Were     Pulse Height Last Period BMI (Body Mass Index)          83 5' 10\" (1.778 m) 2018 (Exact Date) 20.75 kg/m2         Blood Pressure from Last 3 Encounters:   18 121/81   18 118/81   02/15/18 124/89    Weight from Last 3 Encounters:   18 144 lb 9.6 oz (65.6 kg)   18 142 lb 3.2 oz (64.5 kg) "   02/15/18 138 lb 12.8 oz (63 kg) (67 %)*     * Growth percentiles are based on [facility] 2-20 Years data.              Today, you had the following     No orders found for display       Primary Care Provider Fax #    Physician No Ref-Primary [phone]       No address on file        Equal Access to Services     NATHALIAJAKE JOSH : Hadii acosta ku hadliviao Soshamikaali, waaxda luqadaha, qaybta kaalmada adeegkyleeda, waxay idiin haydoraclemencia major marisolyolanda walden . So [facility] [phone].    ATENCIÓN: Si habla español, tiene a diaz disposición servicios gratuitos de asistencia lingüística. Llame al [phone].    We comply with applicable federal civil rights laws and [place] laws. We do not discriminate on the basis of race, color, national origin, age, disability, sex, sexual orientation, or gender identity.            Thank you!     Thank you for choosing [facility]  for your care. Our goal is always to provide you with excellent care. Hearing back from our patients is one way we can continue to improve our services. Please take a few minutes to complete the written survey that you may receive in the mail after your visit with us. Thank you!             Your Updated Medication List - Protect others around you: Learn how to safely use, store and throw away your medicines at www.disposemymeds.org.          This list is accurate as of 3/27/18 11:59 PM.  Always use your most recent med list.                   Brand Name Dispense Instructions for use Diagnosis    albuterol 108 (90 Base) MCG/ACT Inhaler    VENTOLIN HFA    1 Inhaler    Inhale 2 puffs into the lungs every 6 hours    Moderate asthma with exacerbation, unspecified whether persistent       fluticasone-salmeterol 250-50 MCG/DOSE diskus inhaler    ADVAIR DISKUS    1 Inhaler    Inhale 1 puff into the lungs every 12 hours    Asthma exacerbation       guaiFENesin-dextromethorphan 100-10 MG/5ML syrup    ROBITUSSIN DM    560 mL    Take 5 mLs by mouth every 4 hours as  needed for cough    Exercise-induced asthma, Acute bronchitis, unspecified organism       IRON SUPPLEMENT PO           predniSONE 20 MG tablet    DELTASONE    14 tablet    Take 2 tablets (40 mg) by mouth daily    Mild intermittent asthma with exacerbation       * sertraline 50 MG tablet    ZOLOFT    30 tablet    Take 1 tablet (50 mg) by mouth daily    Adjustment disorder with depressed mood       * sertraline 100 MG tablet    ZOLOFT    30 tablet    Take 1 tablet (100 mg) by mouth daily    Adjustment disorder with depressed mood       Spacer/Aero Chamber Mouthpiece Misc     1 each    One spacer for use with inhaled asthma MDI    Mild intermittent asthma with exacerbation       * Notice:  This list has 2 medication(s) that are the same as other medications prescribed for you. Read the directions carefully, and ask your doctor or other care provider to review them with you.

## 2018-04-03 DIAGNOSIS — N91.2 AMENORRHEA: Primary | ICD-10-CM

## 2018-04-11 ENCOUNTER — RADIANT APPOINTMENT (OUTPATIENT)
Dept: BONE DENSITY | Facility: CLINIC | Age: 18
End: 2018-04-11
Attending: FAMILY MEDICINE
Payer: COMMERCIAL

## 2018-04-11 DIAGNOSIS — N91.2 AMENORRHEA: ICD-10-CM

## 2018-08-06 ENCOUNTER — OFFICE VISIT (OUTPATIENT)
Dept: FAMILY MEDICINE | Facility: CLINIC | Age: 18
End: 2018-08-06
Payer: COMMERCIAL

## 2018-08-06 VITALS
SYSTOLIC BLOOD PRESSURE: 114 MMHG | WEIGHT: 148.2 LBS | BODY MASS INDEX: 21.22 KG/M2 | HEART RATE: 80 BPM | HEIGHT: 70 IN | DIASTOLIC BLOOD PRESSURE: 84 MMHG

## 2018-08-06 DIAGNOSIS — R63.4 LOSS OF WEIGHT: ICD-10-CM

## 2018-08-06 DIAGNOSIS — F41.1 GAD (GENERALIZED ANXIETY DISORDER): Primary | ICD-10-CM

## 2018-08-06 NOTE — PROGRESS NOTES
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  Felt like she was doing much better and had gotten of out the habit due to her varying schedule with two jobs.   -Recovered well from bilateral tibial stress reactions.  -Training 3 times per wk intervals and weights.     -Her weight has improved.  Worked with Jolanta.  It changed her perspective on eating once she was injured. She doesn't weight herself anymore since the scale was removed from the locker room.  Doesn't always eat three meals per day.  Likes the structure of work-outs and classes during the school year.  Hard to do on her own.       ROSENDO:  Zoloft 100mg is working well for her.  Increased dose mid Jan.  No side effects.  Seeing Dr. Mary Christie, Sports Psychologist every two weeks.         Great Grandmother:  Osteoporosis    Current Outpatient Prescriptions   Medication     albuterol (VENTOLIN HFA) 108 (90 BASE) MCG/ACT Inhaler     Ferrous Sulfate (IRON SUPPLEMENT PO)     fluticasone-salmeterol (ADVAIR DISKUS) 250-50 MCG/DOSE diskus inhaler     guaiFENesin-dextromethorphan (ROBITUSSIN DM) 100-10 MG/5ML syrup     predniSONE (DELTASONE) 20 MG tablet     sertraline (ZOLOFT) 100 MG tablet     sertraline (ZOLOFT) 50 MG tablet     Spacer/Aero Chamber Mouthpiece MISC     No current facility-administered medications for this visit.          O:  NAD  /84  Pulse 80  Ht 5' 10\" (1.778 m)  Wt 148 lb 3.2 oz (67.2 kg)  LMP 05/06/2018 (Approximate)  BMI 21.26 kg/m2    Affect; nl  Grooming; nl  Eye contact;  Average  Thought content: normal      A:    1) ROSENDO:  Stopped Zoloft on her own and feels like she is doing well without it. Recommend recheck in Sept for counseling with Psychology Dr. Christie. Shalini France, VERNON will check her weight once per month as her training increases.  May need to see Jolanta Juancho in the Fall or if wt is dropping.  Monitor for return of ROSENDO symptoms and loss of appetite and notify us asap.  She " understands that the medications take time to work so she shouldn't let her symptoms progress. F/u PRN.      -I noticed that her last LMP was in May 2018 and she has had a h/o amenorrhea.  The patient was gone and I will ask Shalini France to review that with her.      Shalini France ATC was present for the entire appt.     Farida Moreno MD, CAQ, FACSM, CCD  [facility]  Sports Medicine and Bone Health  Team Physician;  Athletics

## 2018-08-06 NOTE — LETTER
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  Felt like she was doing much better and had gotten of out the habit due to her varying schedule with two jobs.   -Recovered well from bilateral tibial stress reactions.  -Training 3 times per wk intervals and weights.     -Her weight has improved.  Worked with Jolanta.  It changed her perspective on eating once she was injured. She doesn't weight herself anymore since the scale was removed from the locker room.  Doesn't always eat three meals per day.  Likes the structure of work-outs and classes during the school year.  Hard to do on her own.       ROSENDO:  Zoloft 100mg is working well for her.  Increased dose mid Jan.  No side effects.  Seeing Dr. Mary Christie, Sports Psychologist every two weeks.         Great Grandmother:  Osteoporosis    Current Outpatient Prescriptions   Medication     albuterol (VENTOLIN HFA) 108 (90 BASE) MCG/ACT Inhaler     Ferrous Sulfate (IRON SUPPLEMENT PO)     fluticasone-salmeterol (ADVAIR DISKUS) 250-50 MCG/DOSE diskus inhaler     guaiFENesin-dextromethorphan (ROBITUSSIN DM) 100-10 MG/5ML syrup     predniSONE (DELTASONE) 20 MG tablet     sertraline (ZOLOFT) 100 MG tablet     sertraline (ZOLOFT) 50 MG tablet     Spacer/Aero Chamber Mouthpiece MISC     No current facility-administered medications for this visit.          O:  NAD  /84  Pulse 80  Ht 5' 10\" (1.778 m)  Wt 148 lb 3.2 oz (67.2 kg)  LMP 05/06/2018 (Approximate)  BMI 21.26 kg/m2    Affect; nl  Grooming; nl  Eye contact;  Average  Thought content: normal      A:    1) ROSENDO:  Stopped Zoloft on her own and feels like she is doing well without it. Recommend recheck in Sept for counseling with Psychology Dr. Christie. Shalini France, VERNON will check her weight once per month as her training increases.  May need to see Jolanta Dallasp in the Fall or if wt is " dropping.  Monitor for return of ROSENDO symptoms and loss of appetite and notify us asap.  She understands that the medications take time to work so she shouldn't let her symptoms progress. F/u PRN.      -I noticed that her last LMP was in May 2018 and she has had a h/o amenorrhea.  The patient was gone and I will ask Shalini France to review that with her.      Shalini France ATC was present for the entire appt.     Farida Moreno MD, CAQ, FACSM, CCD  [facility]  Sports Medicine and Bone Health  Team Physician;  Athletics        Farida Moreno MD

## 2018-08-06 NOTE — LETTER
Date:August 7, 2018      Patient was self referred, no letter generated. Do not send.        [facility] Health Information

## 2018-08-06 NOTE — MR AVS SNAPSHOT
"              After Visit Summary   8/6/2018    Ana Bright    MRN: [id number]           Patient Information     Date Of Birth          2000        Visit Information        Provider Department      8/6/2018 10:15 AM Farida Moreno MD [facility]        Today's Diagnoses     ROSENDO (generalized anxiety disorder)    -  1    Loss of weight           Follow-ups after your visit        Who to contact     Please call your clinic at [phone] to:    Ask questions about your health    Make or cancel appointments    Discuss your medicines    Learn about your test results    Speak to your doctor            Additional Information About Your Visit        Care EveryWhere ID     This is your Care EveryWhere ID. This could be used by other organizations to access your [facility] medical records  [id number]        Your Vitals Were     Pulse Height Last Period BMI (Body Mass Index)          80 5' 10\" (1.778 m) 05/06/2018 (Approximate) 21.26 kg/m2         Blood Pressure from Last 3 Encounters:   08/06/18 114/84   03/27/18 121/81   03/08/18 118/81    Weight from Last 3 Encounters:   08/06/18 148 lb 3.2 oz (67.2 kg)   03/27/18 144 lb 9.6 oz (65.6 kg)   03/08/18 142 lb 3.2 oz (64.5 kg)              Today, you had the following     No orders found for display       Primary Care Provider Fax #    Physician No Ref-Primary [phone]       No address on file        Equal Access to Services     [facility]: Hadii aad ku hadasho Soshamikaali, waaxda luqadaha, qaybta kaalmada ademartinayada, clary walden . So [facility] [phone].    ATENCIÓN: Si habla español, tiene a diaz disposición servicios gratuitos de asistencia lingüística. Llame al [phone].    We comply with applicable federal civil rights laws and [place] laws. We do not discriminate on the basis of race, color, national origin, age, disability, sex, sexual orientation, or gender identity.            Thank you!     " Thank you for choosing [facility]  for your care. Our goal is always to provide you with excellent care. Hearing back from our patients is one way we can continue to improve our services. Please take a few minutes to complete the written survey that you may receive in the mail after your visit with us. Thank you!             Your Updated Medication List - Protect others around you: Learn how to safely use, store and throw away your medicines at www.disposemymeds.org.          This list is accurate as of 8/6/18  4:08 PM.  Always use your most recent med list.                   Brand Name Dispense Instructions for use Diagnosis    albuterol 108 (90 Base) MCG/ACT Inhaler    VENTOLIN HFA    1 Inhaler    Inhale 2 puffs into the lungs every 6 hours    Moderate asthma with exacerbation, unspecified whether persistent       fluticasone-salmeterol 250-50 MCG/DOSE diskus inhaler    ADVAIR DISKUS    1 Inhaler    Inhale 1 puff into the lungs every 12 hours    Asthma exacerbation       guaiFENesin-dextromethorphan 100-10 MG/5ML syrup    ROBITUSSIN DM    560 mL    Take 5 mLs by mouth every 4 hours as needed for cough    Exercise-induced asthma, Acute bronchitis, unspecified organism       IRON SUPPLEMENT PO           predniSONE 20 MG tablet    DELTASONE    14 tablet    Take 2 tablets (40 mg) by mouth daily    Mild intermittent asthma with exacerbation       * sertraline 50 MG tablet    ZOLOFT    30 tablet    Take 1 tablet (50 mg) by mouth daily    Adjustment disorder with depressed mood       * sertraline 100 MG tablet    ZOLOFT    30 tablet    Take 1 tablet (100 mg) by mouth daily    Adjustment disorder with depressed mood       Spacer/Aero Chamber Mouthpiece Misc     1 each    One spacer for use with inhaled asthma MDI    Mild intermittent asthma with exacerbation       * Notice:  This list has 2 medication(s) that are the same as other medications prescribed for you. Read the directions carefully, and ask  your doctor or other care provider to review them with you.

## 2018-08-09 ENCOUNTER — HOSPITAL ENCOUNTER (EMERGENCY)
Facility: CLINIC | Age: 18
Discharge: HOME | End: 2018-08-09
Attending: EMERGENCY MEDICINE | Admitting: EMERGENCY MEDICINE
Payer: COMMERCIAL

## 2018-08-09 VITALS
TEMPERATURE: 98.5 F | RESPIRATION RATE: 18 BRPM | HEIGHT: 70 IN | OXYGEN SATURATION: 99 % | DIASTOLIC BLOOD PRESSURE: 65 MMHG | SYSTOLIC BLOOD PRESSURE: 117 MMHG | HEART RATE: 65 BPM | WEIGHT: 151.9 LBS | BODY MASS INDEX: 21.75 KG/M2

## 2018-08-09 DIAGNOSIS — T20.27XA BURN, NECK, SECOND DEGREE, INITIAL ENCOUNTER: ICD-10-CM
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
## 2018-08-09 PROCEDURE — 96372 THER/PROPH/DIAG INJ SC/IM: CPT | Performed by: EMERGENCY MEDICINE

## 2018-08-09 PROCEDURE — 90471 IMMUNIZATION ADMIN: CPT | Performed by: EMERGENCY MEDICINE

## 2018-08-09 PROCEDURE — 99282 EMERGENCY DEPT VISIT SF MDM: CPT | Mod: 25 | Performed by: EMERGENCY MEDICINE

## 2018-08-09 PROCEDURE — 90715 TDAP VACCINE 7 YRS/> IM: CPT | Performed by: EMERGENCY MEDICINE

## 2018-08-09 PROCEDURE — 25000128 H RX IP 250 OP 636: Performed by: EMERGENCY MEDICINE

## 2018-08-09 PROCEDURE — 25000125 ZZHC RX 250: Performed by: EMERGENCY MEDICINE

## 2018-08-09 PROCEDURE — 99283 EMERGENCY DEPT VISIT LOW MDM: CPT | Mod: Z6 | Performed by: EMERGENCY MEDICINE

## 2018-08-09 RX ORDER — SILVER SULFADIAZINE 10 MG/G
CREAM TOPICAL
Qty: 400 G | Refills: 1 | Status: SHIPPED | OUTPATIENT
Start: 2018-08-09 | End: 2018-08-23

## 2018-08-09 RX ORDER — HYDROCODONE BITARTRATE AND ACETAMINOPHEN 5; 325 MG/1; MG/1
1 TABLET ORAL EVERY 6 HOURS PRN
Qty: 10 TABLET | Refills: 0 | Status: SHIPPED | OUTPATIENT
Start: 2018-08-09

## 2018-08-09 RX ORDER — SILVER SULFADIAZINE 10 MG/G
50 CREAM TOPICAL ONCE
Status: COMPLETED | OUTPATIENT
Start: 2018-08-09 | End: 2018-08-09

## 2018-08-09 RX ORDER — HYDROCODONE BITARTRATE AND ACETAMINOPHEN 5; 325 MG/1; MG/1
1 TABLET ORAL ONCE
Status: DISCONTINUED | OUTPATIENT
Start: 2018-08-09 | End: 2018-08-09 | Stop reason: HOSPADM

## 2018-08-09 RX ORDER — IBUPROFEN 600 MG/1
600 TABLET, FILM COATED ORAL ONCE
Status: DISCONTINUED | OUTPATIENT
Start: 2018-08-09 | End: 2018-08-09 | Stop reason: HOSPADM

## 2018-08-09 RX ORDER — IBUPROFEN 600 MG/1
600 TABLET, FILM COATED ORAL EVERY 8 HOURS PRN
Qty: 30 TABLET | Refills: 0 | Status: SHIPPED | OUTPATIENT
Start: 2018-08-09

## 2018-08-09 RX ADMIN — SILVER SULFADIAZINE 50 G: 10 CREAM TOPICAL at 19:39

## 2018-08-09 RX ADMIN — CLOSTRIDIUM TETANI TOXOID ANTIGEN (FORMALDEHYDE INACTIVATED), CORYNEBACTERIUM DIPHTHERIAE TOXOID ANTIGEN (FORMALDEHYDE INACTIVATED), BORDETELLA PERTUSSIS TOXOID ANTIGEN (GLUTARALDEHYDE INACTIVATED), BORDETELLA PERTUSSIS FILAMENTOUS HEMAGGLUTININ ANTIGEN (FORMALDEHYDE INACTIVATED), BORDETELLA PERTUSSIS PERTACTIN ANTIGEN, AND BORDETELLA PERTUSSIS FIMBRIAE 2/3 ANTIGEN 0.5 ML: 5; 2; 2.5; 5; 3; 5 INJECTION, SUSPENSION INTRAMUSCULAR at 19:26

## 2018-08-09 ASSESSMENT — ENCOUNTER SYMPTOMS
ABDOMINAL PAIN: 0
COLOR CHANGE: 1
SHORTNESS OF BREATH: 0
HEADACHES: 1
FATIGUE: 1
NECK PAIN: 0
BACK PAIN: 0
DIZZINESS: 0
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
## 2018-08-09 NOTE — ED AVS SNAPSHOT
[facility], [place], Emergency Department    [address] [place] [id number]    Phone:  [phone]                                       Ana Bright   MRN: [id number]    Department:  [facility], Emergency Department   Date of Visit:  8/9/2018           After Visit Summary Signature Page     I have received my discharge instructions, and my questions have been answered. I have discussed any challenges I see with this plan with the nurse or doctor.    ..........................................................................................................................................  Patient/Patient Representative Signature      ..........................................................................................................................................  Patient Representative Print Name and Relationship to Patient    ..................................................               ................................................  Date                                            Time    ..........................................................................................................................................  Reviewed by Signature/Title    ...................................................              ..............................................  Date                                                            Time
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
## 2018-08-10 NOTE — ED NOTES
Discharge instructions done with pt, pt refused ibuprofen and norco prescriptions. Pt reports she will take her own ibuprofen at home. Pt was sent home with silvadene used in ER.

## 2018-09-11 ENCOUNTER — OFFICE VISIT (OUTPATIENT)
Dept: FAMILY MEDICINE | Facility: CLINIC | Age: 18
End: 2018-09-11
Payer: COMMERCIAL

## 2018-09-11 VITALS
DIASTOLIC BLOOD PRESSURE: 75 MMHG | BODY MASS INDEX: 20.9 KG/M2 | HEART RATE: 98 BPM | HEIGHT: 70 IN | SYSTOLIC BLOOD PRESSURE: 116 MMHG | WEIGHT: 146 LBS

## 2018-09-11 DIAGNOSIS — F41.1 GAD (GENERALIZED ANXIETY DISORDER): Primary | ICD-10-CM

## 2018-09-11 NOTE — LETTER
Date:October 3, 2018      Patient was self referred, no letter generated. Do not send.        [facility] Information

## 2018-09-11 NOTE — MR AVS SNAPSHOT
"              After Visit Summary   9/11/2018    Ana Bright    MRN: [id number]           Patient Information     Date Of Birth          2000        Visit Information        Provider Department      9/11/2018 3:45 PM Farida Moreno MD [facility]        Today's Diagnoses     ROSENDO (generalized anxiety disorder)    -  1       Follow-ups after your visit        Who to contact     Please call your clinic at [phone] to:    Ask questions about your health    Make or cancel appointments    Discuss your medicines    Learn about your test results    Speak to your doctor            Additional Information About Your Visit        Care EveryWhere ID     This is your Care EveryWhere ID. This could be used by other organizations to access your [facility] medical records  [id number]        Your Vitals Were     Pulse Height Last Period Breastfeeding? BMI (Body Mass Index)       98 1.778 m (5' 10\") 08/07/2018 (Approximate) No 20.95 kg/m2        Blood Pressure from Last 3 Encounters:   09/11/18 116/75   08/09/18 117/65   08/06/18 114/84    Weight from Last 3 Encounters:   09/11/18 66.2 kg (146 lb)   08/09/18 68.9 kg (151 lb 14.4 oz)   08/06/18 67.2 kg (148 lb 3.2 oz)              Today, you had the following     No orders found for display         Today's Medication Changes          These changes are accurate as of 9/11/18 11:59 PM.  If you have any questions, ask your nurse or doctor.               These medicines have changed or have updated prescriptions.        Dose/Directions    * sertraline 50 MG tablet   Commonly known as:  ZOLOFT   This may have changed:  Another medication with the same name was added. Make sure you understand how and when to take each.   Used for:  Adjustment disorder with depressed mood   Changed by:  Farida Moreno MD        Dose:  50 mg   Take 1 tablet (50 mg) by mouth daily   Quantity:  30 tablet   Refills:  1       * sertraline 100 MG tablet "   Commonly known as:  ZOLOFT   This may have changed:  Another medication with the same name was added. Make sure you understand how and when to take each.   Used for:  Adjustment disorder with depressed mood   Changed by:  Farida Moreno MD        Dose:  100 mg   Take 1 tablet (100 mg) by mouth daily   Quantity:  30 tablet   Refills:  3       * sertraline 50 MG tablet   Commonly known as:  ZOLOFT   This may have changed:  You were already taking a medication with the same name, and this prescription was added. Make sure you understand how and when to take each.   Used for:  ROSENDO (generalized anxiety disorder)   Changed by:  Farida Moreno MD        Dose:  100 mg   Take 2 tablets (100 mg) by mouth daily   Quantity:  60 tablet   Refills:  0       * Notice:  This list has 3 medication(s) that are the same as other medications prescribed for you. Read the directions carefully, and ask your doctor or other care provider to review them with you.         Where to get your medicines      These medications were sent to [place] [address] [place] [address]  [address] [facility] [address]     Phone:  [phone]     sertraline 50 MG tablet                Primary Care Provider Fax #    Physician No Ref-Primary [phone]       No address on file        Equal Access to Services     MICHAEL MORENO : Carlo Herrera, wagomezda romeo, qaybta kaalmada adevianney, clary conklin. So [facility] [phone].    ATENCIÓN: Si habla español, tiene a diaz disposición servicios gratuitos de asistencia lingüística. Llame al [phone].    We comply with applicable federal civil rights laws and [place] laws. We do not discriminate on the basis of race, color, national origin, age, disability, sex, sexual orientation, or gender identity.            Thank you!     Thank you for choosing [facility]  for your care. Our goal  is always to provide you with excellent care. Hearing back from our patients is one way we can continue to improve our services. Please take a few minutes to complete the written survey that you may receive in the mail after your visit with us. Thank you!             Your Updated Medication List - Protect others around you: Learn how to safely use, store and throw away your medicines at www.disposemymeds.org.          This list is accurate as of 9/11/18 11:59 PM.  Always use your most recent med list.                   Brand Name Dispense Instructions for use Diagnosis    albuterol 108 (90 Base) MCG/ACT inhaler    VENTOLIN HFA    1 Inhaler    Inhale 2 puffs into the lungs every 6 hours    Moderate asthma with exacerbation, unspecified whether persistent       fluticasone-salmeterol 250-50 MCG/DOSE diskus inhaler    ADVAIR DISKUS    1 Inhaler    Inhale 1 puff into the lungs every 12 hours    Asthma exacerbation       guaiFENesin-dextromethorphan 100-10 MG/5ML syrup    ROBITUSSIN DM    560 mL    Take 5 mLs by mouth every 4 hours as needed for cough    Exercise-induced asthma, Acute bronchitis, unspecified organism       HYDROcodone-acetaminophen 5-325 MG per tablet    NORCO    10 tablet    Take 1 tablet by mouth every 6 hours as needed for severe pain        ibuprofen 600 MG tablet    ADVIL/MOTRIN    30 tablet    Take 1 tablet (600 mg) by mouth every 8 hours as needed for moderate pain        IRON SUPPLEMENT PO           predniSONE 20 MG tablet    DELTASONE    14 tablet    Take 2 tablets (40 mg) by mouth daily    Mild intermittent asthma with exacerbation       * sertraline 50 MG tablet    ZOLOFT    30 tablet    Take 1 tablet (50 mg) by mouth daily    Adjustment disorder with depressed mood       * sertraline 100 MG tablet    ZOLOFT    30 tablet    Take 1 tablet (100 mg) by mouth daily    Adjustment disorder with depressed mood       * sertraline 50 MG tablet    ZOLOFT    60 tablet    Take 2 tablets (100 mg) by mouth  daily    ROSENDO (generalized anxiety disorder)       Spacer/Aero Chamber Mouthpiece Misc     1 each    One spacer for use with inhaled asthma MDI    Mild intermittent asthma with exacerbation       * Notice:  This list has 3 medication(s) that are the same as other medications prescribed for you. Read the directions carefully, and ask your doctor or other care provider to review them with you.

## 2018-09-11 NOTE — LETTER
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 Tried to     -Wants to go back on Zoloft 100 mg. Struggling with anxiety symptoms.     -Valdo, Sports Dietician, met with once and has another appt coming up.   Focusing on adding breakfast. Feels that she is doing better with her diet.     -May 2018:  IUD in May, no menses except in August, she had a normal period with some cramps.       O: NAD  /75  Pulse 98  Ht 1.778 m (5' 10\")  Wt 66.2 kg (146 lb)  LMP 08/07/2018 (Approximate)  Breastfeeding? No  BMI 20.95 kg/m2    Good eye contact  A little nervous  Appropriately groomed  Normal thought content      A:  ROSENDO  P:  Restart Zoloft 50mg for one week and then increase to 100mg qday if no significant side effects.  Script given.    RTC in 6- 8 weeks  See Dr. Mary Christie, Psychologist who she has seen before.     Farida Moreno MD, CAQ, FACSM, CCD  [facility]  Sports Medicine and Bone Health  Team Physician;  Athletics      Farida Moreno MD    "

## 2018-09-11 NOTE — PROGRESS NOTES
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 Tried to     -Wants to go back on Zoloft 100 mg. Struggling with anxiety symptoms.     -Valdo, Sports Dietician, met with once and has another appt coming up.   Focusing on adding breakfast. Feels that she is doing better with her diet.     -May 2018:  IUD in May, no menses except in August, she had a normal period with some cramps.       O: NAD  /75  Pulse 98  Ht 1.778 m (5' 10\")  Wt 66.2 kg (146 lb)  LMP 08/07/2018 (Approximate)  Breastfeeding? No  BMI 20.95 kg/m2    Good eye contact  A little nervous  Appropriately groomed  Normal thought content      A:  ROSENDO  P:  Restart Zoloft 50mg for one week and then increase to 100mg qday if no significant side effects.  Script given.    RTC in 6- 8 weeks  See Dr. Mary Christie, Psychologist who she has seen before.     Farida Moreno MD, CAQ, FACSM, CCD  [facility]  Sports Medicine and Bone Health  Team Physician;  Athletics    "

## 2018-10-24 ENCOUNTER — OFFICE VISIT (OUTPATIENT)
Dept: ORTHOPEDICS | Facility: CLINIC | Age: 18
End: 2018-10-24
Payer: COMMERCIAL

## 2018-10-24 VITALS
SYSTOLIC BLOOD PRESSURE: 112 MMHG | HEIGHT: 69 IN | DIASTOLIC BLOOD PRESSURE: 74 MMHG | WEIGHT: 146.6 LBS | HEART RATE: 59 BPM | BODY MASS INDEX: 21.71 KG/M2

## 2018-10-24 DIAGNOSIS — S06.0X0A CONCUSSION WITHOUT LOSS OF CONSCIOUSNESS, INITIAL ENCOUNTER: Primary | ICD-10-CM

## 2018-10-24 DIAGNOSIS — J45.21 MILD INTERMITTENT ASTHMA WITH EXACERBATION: ICD-10-CM

## 2018-10-24 NOTE — MR AVS SNAPSHOT
"              After Visit Summary   10/24/2018    Ana Bright    MRN: [id number]           Patient Information     Date Of Birth          2000        Visit Information        Provider Department      10/24/2018 2:45 PM Em Green MD [facility]        Today's Diagnoses     Concussion without loss of consciousness, initial encounter    -  1    Mild intermittent asthma with exacerbation           Follow-ups after your visit        Who to contact     Please call your clinic at [phone] to:    Ask questions about your health    Make or cancel appointments    Discuss your medicines    Learn about your test results    Speak to your doctor            Additional Information About Your Visit        Care EveryWhere ID     This is your Care EveryWhere ID. This could be used by other organizations to access your [facility] medical records  [id number]        Your Vitals Were     Pulse Height BMI (Body Mass Index)             59 1.753 m (5' 9\") 21.65 kg/m2          Blood Pressure from Last 3 Encounters:   10/24/18 112/74   09/11/18 116/75   08/09/18 117/65    Weight from Last 3 Encounters:   10/24/18 66.5 kg (146 lb 9.6 oz)   09/11/18 66.2 kg (146 lb)   08/09/18 68.9 kg (151 lb 14.4 oz)              Today, you had the following     No orders found for display       Primary Care Provider Fax #    Physician No Ref-Primary [phone]       No address on file        Equal Access to Services     MICHAEL MORENO : Hadii acosta ku hadasho Soshereen, waaxda luqadaha, qaybta kaalmada adestormyda, clary walden . So [facility] [phone].    ATENCIÓN: Si habla español, tiene a diaz disposición servicios gratuitos de asistencia lingüística. Llame al [phone].    We comply with applicable federal civil rights laws and [place] laws. We do not discriminate on the basis of race, color, national origin, age, disability, sex, sexual orientation, or gender identity.            Thank " you!     Thank you for choosing [facility]  for your care. Our goal is always to provide you with excellent care. Hearing back from our patients is one way we can continue to improve our services. Please take a few minutes to complete the written survey that you may receive in the mail after your visit with us. Thank you!             Your Updated Medication List - Protect others around you: Learn how to safely use, store and throw away your medicines at www.disposemymeds.org.          This list is accurate as of 10/24/18  3:13 PM.  Always use your most recent med list.                   Brand Name Dispense Instructions for use Diagnosis    albuterol 108 (90 Base) MCG/ACT inhaler    VENTOLIN HFA    1 Inhaler    Inhale 2 puffs into the lungs every 6 hours    Moderate asthma with exacerbation, unspecified whether persistent       fluticasone-salmeterol 250-50 MCG/DOSE diskus inhaler    ADVAIR DISKUS    1 Inhaler    Inhale 1 puff into the lungs every 12 hours    Asthma exacerbation       guaiFENesin-dextromethorphan 100-10 MG/5ML syrup    ROBITUSSIN DM    560 mL    Take 5 mLs by mouth every 4 hours as needed for cough    Exercise-induced asthma, Acute bronchitis, unspecified organism       HYDROcodone-acetaminophen 5-325 MG per tablet    NORCO    10 tablet    Take 1 tablet by mouth every 6 hours as needed for severe pain        ibuprofen 600 MG tablet    ADVIL/MOTRIN    30 tablet    Take 1 tablet (600 mg) by mouth every 8 hours as needed for moderate pain        IRON SUPPLEMENT PO           predniSONE 20 MG tablet    DELTASONE    14 tablet    Take 2 tablets (40 mg) by mouth daily    Mild intermittent asthma with exacerbation       * sertraline 50 MG tablet    ZOLOFT    30 tablet    Take 1 tablet (50 mg) by mouth daily    Adjustment disorder with depressed mood       * sertraline 100 MG tablet    ZOLOFT    30 tablet    Take 1 tablet (100 mg) by mouth daily    Adjustment disorder with depressed mood        * sertraline 50 MG tablet    ZOLOFT    60 tablet    Take 2 tablets (100 mg) by mouth daily    ROSENDO (generalized anxiety disorder)       Spacer/Aero Chamber Mouthpiece Misc     1 each    One spacer for use with inhaled asthma MDI    Mild intermittent asthma with exacerbation       * Notice:  This list has 3 medication(s) that are the same as other medications prescribed for you. Read the directions carefully, and ask your doctor or other care provider to review them with you.

## 2018-10-24 NOTE — PROGRESS NOTES
"CC: concussion    HPI:  Ana Bright is a 18 year old female who is seen for evaluation of a possible concussion that occurred 2 days ago.   Mechanism of injury: fell off scooter  Immediate Symptoms:  Headache, dizziness  Initial symptom score 6/22 severity score 12/132 (fatigue, pressure in head, dizziness difficulty remembering, HA, sensitivity to light  Orientation 4/5  Immediate Memory 15/15  Concentration 2/5  Delayed Recall 4/5  FABRIZIO 11/30    Tried to go to school yesterday, but was unable to get through class due to symptoms.    Symptoms at this visit:   Headache and pressure in head - mild   Irritability - mild   Fatigue - mild   Sleep: Sleeping more than usual and waking 2-3 times a night feeling dizzy, had a dream that she was dizzy    Past pertinent history: Migraines: no     Depression: Yes: Sertraline - well controlled     Anxiety: no     Learning disability: no     ADHD: no     Past History of concussions: Yes:  Number of concussions: 1, Longest amount of time missed: 3  weeks    Patient's past medical, surgical, social and family histories reviewed:  Asthma, Depression    REVIEW OF SYSTEMS:  CONSTITUTIONAL:NEGATIVE for fever, chills, change in weight  INTEGUMENTARY/SKIN: NEGATIVE for worrisome rashes, moles or lesions  MUSCULOSKELETAL:See HPI above  NEURO: NEGATIVE for weakness, dizziness or paresthesias and NEGATIVE for light/noise sensitivity  RESP: increased coughing and \"bronchospasm\" per patient - she took one of her prednisone tablets last night and restarted Advair    /74  Pulse 59  Ht 1.753 m (5' 9\")  Wt 66.5 kg (146 lb 9.6 oz)  BMI 21.65 kg/m2      EXAM:  GENERAL APPEARANCE: healthy, alert and no distress   SKIN: no suspicious lesions or rashes  NEURO: Normal strength and tone, mentation intact, speech normal, gait normal including heel/toe/tandem walking and cranial nerves 2-12 intact  PSYCH:  mentation appears normal and affect normal/bright  RESP: CTA, no rales or wheeze  NECK:  " supple, non-tender, FULL ROM    Neurologic:  Cranial Nerves 2-12:  intact  BRITTANY:Yes  EOMI:Yes  Nystagmus: No  Coordination:  Finger to Nose: normal    Heel to Shin: normal    Rapid Alternating Movements: normal  Balance Testing: Rhomberg:normal        Forward Tandem: normal  Advanced Balance Testing:     Backward Tandem: not tested    Single leg Balance with simultaneous cognitive test :normal  Painful Eye movements: No  Convergence Testing: Normal (</= 6 cm)  Visual Field Testing: normal  Neuro vestibular testing: Head Still eyes move side to side: no nystagmus, no headache, no dizziness and no nausea  Head still eyes move up and down: no nystagmus, no headache, no dizziness and no nausea  Eyes fixed head moves side to side: no nystagmus, no headache, no dizziness and no nausea  Eyes fixed, head moves up and down: no nystagmus, no headache, no dizziness and no nausea       GAIT: Walk in hallway at normal speed: Able       Impact Testing Scores: ImPACT Testing not performed    ASSESSMENT  1. Concussion  2. Asthma exacerbation - mild, normal exam today    PLAN  1. Discussed diagnosis and treatment strategy. [facility] concussion protocol will be followed.  2. Recommended rest from physical and cognitive activities. Once symptoms have cleared, she may begin a return to play protocol.  3. School accommodations will be provided.   4. Follow up in training room once symptoms resolve.  5. Recommended not initiating prednisone for asthma. Resume Advair inhaler BID and albuterol as needed.    Em Cook was present for most of the visit.

## 2018-10-24 NOTE — LETTER
"  10/24/2018      RE: Ana Bright  [address] [place] [address]       CC: concussion    HPI:  Ana Bright is a 18 year old female who is seen for evaluation of a possible concussion that occurred 2 days ago.   Mechanism of injury: fell off scooter  Immediate Symptoms:  Headache, dizziness  Initial symptom score 6/22 severity score 12/132 (fatigue, pressure in head, dizziness difficulty remembering, HA, sensitivity to light  Orientation 4/5  Immediate Memory 15/15  Concentration 2/5  Delayed Recall 4/5  FABRIZIO 11/30    Tried to go to school yesterday, but was unable to get through class due to symptoms.    Symptoms at this visit:   Headache and pressure in head - mild   Irritability - mild   Fatigue - mild   Sleep: Sleeping more than usual and waking 2-3 times a night feeling dizzy, had a dream that she was dizzy    Past pertinent history: Migraines: no     Depression: Yes: Sertraline - well controlled     Anxiety: no     Learning disability: no     ADHD: no     Past History of concussions: Yes:  Number of concussions: 1, Longest amount of time missed: 3  weeks    Patient's past medical, surgical, social and family histories reviewed:  Asthma, Depression    REVIEW OF SYSTEMS:  CONSTITUTIONAL:NEGATIVE for fever, chills, change in weight  INTEGUMENTARY/SKIN: NEGATIVE for worrisome rashes, moles or lesions  MUSCULOSKELETAL:See HPI above  NEURO: NEGATIVE for weakness, dizziness or paresthesias and NEGATIVE for light/noise sensitivity  RESP: increased coughing and \"bronchospasm\" per patient - she took one of her prednisone tablets last night and restarted Advair    /74  Pulse 59  Ht 1.753 m (5' 9\")  Wt 66.5 kg (146 lb 9.6 oz)  BMI 21.65 kg/m2      EXAM:  GENERAL APPEARANCE: healthy, alert and no distress   SKIN: no suspicious lesions or rashes  NEURO: Normal strength and tone, mentation intact, speech normal, gait normal including heel/toe/tandem walking and cranial nerves 2-12 intact  PSYCH:  " mentation appears normal and affect normal/bright  RESP: CTA, no rales or wheeze  NECK:  supple, non-tender, FULL ROM    Neurologic:  Cranial Nerves 2-12:  intact  BRITTANY:Yes  EOMI:Yes  Nystagmus: No  Coordination:  Finger to Nose: normal    Heel to Shin: normal    Rapid Alternating Movements: normal  Balance Testing: Rhomberg:normal        Forward Tandem: normal  Advanced Balance Testing:     Backward Tandem: not tested    Single leg Balance with simultaneous cognitive test :normal  Painful Eye movements: No  Convergence Testing: Normal (</= 6 cm)  Visual Field Testing: normal  Neuro vestibular testing: Head Still eyes move side to side: no nystagmus, no headache, no dizziness and no nausea  Head still eyes move up and down: no nystagmus, no headache, no dizziness and no nausea  Eyes fixed head moves side to side: no nystagmus, no headache, no dizziness and no nausea  Eyes fixed, head moves up and down: no nystagmus, no headache, no dizziness and no nausea       GAIT: Walk in hallway at normal speed: Able       Impact Testing Scores: ImPACT Testing not performed    ASSESSMENT  1. Concussion  2. Asthma exacerbation - mild, normal exam today    PLAN  1. Discussed diagnosis and treatment strategy. [facility] concussion protocol will be followed.  2. Recommended rest from physical and cognitive activities. Once symptoms have cleared, she may begin a return to play protocol.  3. School accommodations will be provided.   4. Follow up in training room once symptoms resolve.  5. Recommended not initiating prednisone for asthma. Resume Advair inhaler BID and albuterol as needed.    Em Cook was present for most of the visit.          Em Green MD

## 2018-10-24 NOTE — LETTER
Date:October 25, 2018      Patient was self referred, no letter generated. Do not send.        [facility] Health Information

## 2018-10-25 ENCOUNTER — HOSPITAL ENCOUNTER (EMERGENCY)
Facility: CLINIC | Age: 18
Discharge: HOME | End: 2018-10-25
Attending: EMERGENCY MEDICINE | Admitting: EMERGENCY MEDICINE
Payer: COMMERCIAL

## 2018-10-25 VITALS
DIASTOLIC BLOOD PRESSURE: 99 MMHG | OXYGEN SATURATION: 97 % | HEIGHT: 69 IN | HEART RATE: 85 BPM | RESPIRATION RATE: 18 BRPM | TEMPERATURE: 98.1 F | WEIGHT: 140 LBS | SYSTOLIC BLOOD PRESSURE: 138 MMHG | BODY MASS INDEX: 20.73 KG/M2

## 2018-10-25 DIAGNOSIS — R05.9 COUGH: ICD-10-CM

## 2018-10-25 DIAGNOSIS — R06.00 DYSPNEA, UNSPECIFIED TYPE: ICD-10-CM

## 2018-10-25 DIAGNOSIS — J45.21 MILD INTERMITTENT ASTHMA WITH EXACERBATION: ICD-10-CM

## 2018-10-25 DIAGNOSIS — J98.01 ACUTE BRONCHOSPASM: ICD-10-CM

## 2018-10-25 PROCEDURE — 25000125 ZZHC RX 250: Performed by: EMERGENCY MEDICINE

## 2018-10-25 PROCEDURE — 99283 EMERGENCY DEPT VISIT LOW MDM: CPT | Mod: 25

## 2018-10-25 PROCEDURE — 99284 EMERGENCY DEPT VISIT MOD MDM: CPT | Mod: Z6 | Performed by: EMERGENCY MEDICINE

## 2018-10-25 PROCEDURE — 94640 AIRWAY INHALATION TREATMENT: CPT

## 2018-10-25 RX ORDER — PREDNISONE 20 MG/1
40 TABLET ORAL ONCE
Status: COMPLETED | OUTPATIENT
Start: 2018-10-25 | End: 2018-10-25

## 2018-10-25 RX ORDER — IPRATROPIUM BROMIDE AND ALBUTEROL SULFATE 2.5; .5 MG/3ML; MG/3ML
3 SOLUTION RESPIRATORY (INHALATION) ONCE
Status: COMPLETED | OUTPATIENT
Start: 2018-10-25 | End: 2018-10-25

## 2018-10-25 RX ORDER — PREDNISONE 20 MG/1
40 TABLET ORAL DAILY
Qty: 8 TABLET | Refills: 0 | Status: SHIPPED | OUTPATIENT
Start: 2018-10-25 | End: 2018-10-29

## 2018-10-25 RX ADMIN — IPRATROPIUM BROMIDE AND ALBUTEROL SULFATE 3 ML: .5; 3 SOLUTION RESPIRATORY (INHALATION) at 20:50

## 2018-10-25 RX ADMIN — PREDNISONE 40 MG: 20 TABLET ORAL at 20:50

## 2018-10-25 ASSESSMENT — ENCOUNTER SYMPTOMS
HEADACHES: 0
NAUSEA: 1
CHILLS: 0
SHORTNESS OF BREATH: 1
CHEST TIGHTNESS: 1
NECK STIFFNESS: 0
ARTHRALGIAS: 0
FEVER: 0
DIFFICULTY URINATING: 0
COLOR CHANGE: 0
COUGH: 1
VOMITING: 0
CONFUSION: 0
TROUBLE SWALLOWING: 0
EYE REDNESS: 0
ABDOMINAL PAIN: 0

## 2018-10-25 NOTE — ED AVS SNAPSHOT
[facility], Emergency Department    [address] [address]    Phone:  [phone]                                       Ana Bright   MRN: [id number]    Department:  [facility], Emergency Department   Date of Visit:  10/25/2018           Patient Information     Date Of Birth          2000        Your diagnoses for this visit were:     Acute bronchospasm     Cough     Dyspnea, unspecified type     Mild intermittent asthma with exacerbation        You were seen by Kristine Guy MD.        Discharge Instructions         .Thank you for your patience today.  Please follow-up with your regular doctor in the next 2-3 days for further evaluation and follow-up care.  Please call to schedule an appointment.  Please continue your own medications.  Please use inhaler 2 puffs as needed for cough or shortness of breath.  These take prednisone daily every morning for the next 4 days.  Please return to the ER if you develop any worsening of your current symptoms.  It was a pleasure taking care of you today.  We hope you feel better soon.    Bronchospasm (Adult)    Bronchospasm occurs when the airways (bronchial tubes) go into spasm and contract. This makes it hard to breathe and causes wheezing (a high-pitched whistling sound). Bronchospasm can also cause frequent coughing without wheezing.  Bronchospasm is due to irritation, inflammation, or allergic reaction of the airways. People with asthma get bronchospasm. However, not everyone with bronchospasm has asthma.  Being exposed to harmful fumes, a recent case of bronchitis, exercise, or a flare-up of chronic obstructive pulmonary disease (COPD) may cause the airways to spasm. An episode of bronchospasm may last 7 to 14 days. Medicine may be prescribed to relax the airways and prevent wheezing. Antibiotics will be prescribed only if your healthcare provider thinks there is a bacterial infection. Antibiotics do not help a viral infection.  Home  care    Drink lots of water or other fluids (at least 10 glasses a day) during an attack. This will loosen lung secretions and make it easier to breathe. If you have heart or kidney disease, check with your doctor before you drink extra fluids.    Take prescribed medicine exactly at the times advised. If you take an inhaled medicine to help with breathing, do not use it more than once every 4 hours, unless told to do so. If prescribed an antibiotic or prednisone, take all of the medicine, even if you are feeling better after a few days.    Do not smoke. Also avoid being exposed to secondhand smoke.    If you were given an inhaler, use it exactly as directed. If you need to use it more often than prescribed, your condition may be getting worse. Contact your healthcare provider.  Follow-up care  Follow up with your healthcare provider, or as advised.  If you are age 65 or older, have a chronic lung disease or condition that affects your immune system, or you smoke, ask your healthcare provider about getting a pneumococcal vaccine, as well as a yearly flu shot (influenza vaccine).  When to seek medical advice  Call your healthcare provider right away if any of these occur:    You need to use your inhalers more often than usual    Fever of 100.4 F (38 C) or higher, or as directed by your healthcare provider    Cough that brings up lots of dark-colored sputum (mucus)    You don't get better within 24 hours  Call 911  Call 911 if any of these occur:    Coughing up bloody sputum (mucus)    Chest pain with each breath    Increased wheezing or shortness of breath  Date Last Reviewed: 9/13/2015 2000-2017 [employer]. [address] [place] [address], [place] [address]. All rights reserved. This information is not intended as a substitute for professional medical care. Always follow your healthcare professional's instructions.          24 Hour Appointment Hotline       To make an appointment at any [facility], call  [phone] ([phone]). If you don't have a family doctor or clinic, we will help you find one. [facility] are conveniently located to serve the needs of you and your family.             Review of your medicines      Our records show that you are taking the medicines listed below. If these are incorrect, please call your family doctor or clinic.        Dose / Directions Last dose taken    albuterol 108 (90 Base) MCG/ACT inhaler   Commonly known as:  VENTOLIN HFA   Dose:  2 puff   Quantity:  1 Inhaler        Inhale 2 puffs into the lungs every 6 hours   Refills:  3        fluticasone-salmeterol 250-50 MCG/DOSE diskus inhaler   Commonly known as:  ADVAIR DISKUS   Dose:  1 puff   Quantity:  1 Inhaler        Inhale 1 puff into the lungs every 12 hours   Refills:  0        guaiFENesin-dextromethorphan 100-10 MG/5ML syrup   Commonly known as:  ROBITUSSIN DM   Dose:  5 mL   Quantity:  560 mL        Take 5 mLs by mouth every 4 hours as needed for cough   Refills:  1        HYDROcodone-acetaminophen 5-325 MG per tablet   Commonly known as:  NORCO   Dose:  1 tablet   Quantity:  10 tablet        Take 1 tablet by mouth every 6 hours as needed for severe pain   Refills:  0        ibuprofen 600 MG tablet   Commonly known as:  ADVIL/MOTRIN   Dose:  600 mg   Quantity:  30 tablet        Take 1 tablet (600 mg) by mouth every 8 hours as needed for moderate pain   Refills:  0        IRON SUPPLEMENT PO        Refills:  0        predniSONE 20 MG tablet   Commonly known as:  DELTASONE   Dose:  40 mg   Quantity:  8 tablet        Take 2 tablets (40 mg) by mouth daily for 4 days   Refills:  0        * sertraline 50 MG tablet   Commonly known as:  ZOLOFT   Dose:  50 mg   Quantity:  30 tablet        Take 1 tablet (50 mg) by mouth daily   Refills:  1        * sertraline 100 MG tablet   Commonly known as:  ZOLOFT   Dose:  100 mg   Quantity:  30 tablet        Take 1 tablet (100 mg) by mouth daily   Refills:  3        * sertraline  50 MG tablet   Commonly known as:  ZOLOFT   Dose:  100 mg   Quantity:  60 tablet        Take 2 tablets (100 mg) by mouth daily   Refills:  0        Spacer/Aero Chamber Mouthpiece Misc   Quantity:  1 each        One spacer for use with inhaled asthma MDI   Refills:  0        * Notice:  This list has 3 medication(s) that are the same as other medications prescribed for you. Read the directions carefully, and ask your doctor or other care provider to review them with you.            Prescriptions were sent or printed at these locations (1 Prescription)                   Other Prescriptions                Printed at Department/Unit printer (1 of 1)         predniSONE (DELTASONE) 20 MG tablet                Orders Needing Specimen Collection     None      Pending Results     No orders found from 10/23/2018 to 10/26/2018.            Pending Culture Results     No orders found from 10/23/2018 to 10/26/2018.            Pending Results Instructions     If you had any lab results that were not finalized at the time of your Discharge, you can call the ED Lab Result RN at [phone]. You will be contacted by this team for any positive Lab results or changes in treatment. The nurses are available 7 days a week from 10A to 6:30P.  You can leave a message 24 hours per day and they will return your call.        Thank you for choosing [facility]       Thank you for choosing [facility] for your care. Our goal is always to provide you with excellent care. Hearing back from our patients is one way we can continue to improve our services. Please take a few minutes to complete the written survey that you may receive in the mail after you visit with us. Thank you!        Care EveryWhere ID     This is your Care EveryWhere ID. This could be used by other organizations to access your [facility] medical records  [id number]        Equal Access to Services     MICHAEL MORENO AH: andrews Molina qaybta kaalmada  clary jang ah. So [facility] [phone].    ATENCIÓN: Si habla español, tiene a diaz disposición servicios gratuitos de asistencia lingüística. Llame al [phone].    We comply with applicable federal civil rights laws and [place] laws. We do not discriminate on the basis of race, color, national origin, age, disability, sex, sexual orientation, or gender identity.            After Visit Summary       This is your record. Keep this with you and show to your community pharmacist(s) and doctor(s) at your next visit.

## 2018-10-25 NOTE — ED AVS SNAPSHOT
[facility], [place], Emergency Department    [address] [place] [address]    Phone:  [phone]                                       Ana Brigth   MRN: [id number]    Department:  [facility], Emergency Department   Date of Visit:  10/25/2018           After Visit Summary Signature Page     I have received my discharge instructions, and my questions have been answered. I have discussed any challenges I see with this plan with the nurse or doctor.    ..........................................................................................................................................  Patient/Patient Representative Signature      ..........................................................................................................................................  Patient Representative Print Name and Relationship to Patient    ..................................................               ................................................  Date                                   Time    ..........................................................................................................................................  Reviewed by Signature/Title    ...................................................              ..............................................  Date                                               Time          22EPIC Rev 08/18

## 2018-10-26 NOTE — DISCHARGE INSTRUCTIONS
.Thank you for your patience today.  Please follow-up with your regular doctor in the next 2-3 days for further evaluation and follow-up care.  Please call to schedule an appointment.  Please continue your own medications.  Please use inhaler 2 puffs as needed for cough or shortness of breath.  These take prednisone daily every morning for the next 4 days.  Please return to the ER if you develop any worsening of your current symptoms.  It was a pleasure taking care of you today.  We hope you feel better soon.    Bronchospasm (Adult)    Bronchospasm occurs when the airways (bronchial tubes) go into spasm and contract. This makes it hard to breathe and causes wheezing (a high-pitched whistling sound). Bronchospasm can also cause frequent coughing without wheezing.  Bronchospasm is due to irritation, inflammation, or allergic reaction of the airways. People with asthma get bronchospasm. However, not everyone with bronchospasm has asthma.  Being exposed to harmful fumes, a recent case of bronchitis, exercise, or a flare-up of chronic obstructive pulmonary disease (COPD) may cause the airways to spasm. An episode of bronchospasm may last 7 to 14 days. Medicine may be prescribed to relax the airways and prevent wheezing. Antibiotics will be prescribed only if your healthcare provider thinks there is a bacterial infection. Antibiotics do not help a viral infection.  Home care    Drink lots of water or other fluids (at least 10 glasses a day) during an attack. This will loosen lung secretions and make it easier to breathe. If you have heart or kidney disease, check with your doctor before you drink extra fluids.    Take prescribed medicine exactly at the times advised. If you take an inhaled medicine to help with breathing, do not use it more than once every 4 hours, unless told to do so. If prescribed an antibiotic or prednisone, take all of the medicine, even if you are feeling better after a few days.    Do not smoke.  Also avoid being exposed to secondhand smoke.    If you were given an inhaler, use it exactly as directed. If you need to use it more often than prescribed, your condition may be getting worse. Contact your healthcare provider.  Follow-up care  Follow up with your healthcare provider, or as advised.  If you are age 65 or older, have a chronic lung disease or condition that affects your immune system, or you smoke, ask your healthcare provider about getting a pneumococcal vaccine, as well as a yearly flu shot (influenza vaccine).  When to seek medical advice  Call your healthcare provider right away if any of these occur:    You need to use your inhalers more often than usual    Fever of 100.4 F (38 C) or higher, or as directed by your healthcare provider    Cough that brings up lots of dark-colored sputum (mucus)    You don't get better within 24 hours  Call 911  Call 911 if any of these occur:    Coughing up bloody sputum (mucus)    Chest pain with each breath    Increased wheezing or shortness of breath  Date Last Reviewed: 9/13/2015 2000-2017 [employer]. [address] [place] [address], [place] [address]. All rights reserved. This information is not intended as a substitute for professional medical care. Always follow your healthcare professional's instructions.

## 2018-10-26 NOTE — ED PROVIDER NOTES
History     Chief Complaint   Patient presents with     Shortness of Breath     Cough     HPI  Ana Bright is a 18 year old female with a history of asthma with reported bronchial spasms who presents for evaluation of chest tightness, cough, and shortness of breath.     Patient complains over the past two days she has had a dry cough consistent with prior episodes of bronchial spasms she has had in the past. She has been using prescribed nebulizers, Advair, and rescue inhaler for her spasms without significant relief. She presents tonight because approximately 1.5 hours prior to arrival she was getting out of the shower when she had the sudden sensation that something was lodged in throat. She complains of increased chest tightness, difficulty breathing, and shortness of breath. She felt nauseated with the urge to vomit because it felt like something was stuck, but she denies actually vomiting. The onset of her symptoms tonight was approximately one hour after she ate food. She attempted to take deep breaths, but found this very difficult. She also went outside to try to get fresh air and also drank a lot of water, but this did not improve her symptoms. Currently, she reports she is still having chest tightness and difficulty breathing, though this is less severe compared to initial onset. She did use one puff of her Advair inhaler and a nebulizer treatment without significant improvement. She states her current symptoms are consistent with prior episodes of bronchial spasms she has had, though this is the first time she has had the sensation of something being stuck in her chest. She denies fever, chills, or pain with swallowing.  No recent illness. No history of allergic reactions. She has been prescribed prednisone for her asthma/bronchial spasms in the past which she states typically resolve her symptoms. Of note, patient reports she did fall while at [employer] practice on Monday, three days ago, and was  diagnosed with a concussion from that fall.     I have reviewed the Medications, Allergies, Past Medical and Surgical History, and Social History in the [employer] system.  Past Medical History:   Diagnosis Date     Uncomplicated asthma        No past surgical history on file.    Family History   Problem Relation Age of Onset     Hyperlipidemia Mother      Hypertension Maternal Grandmother      Hyperlipidemia Maternal Grandmother      Diabetes Maternal Grandfather      Hypertension Maternal Grandfather        Social History   Substance Use Topics     Smoking status: Never Smoker     Smokeless tobacco: Never Used     Alcohol use No       No current facility-administered medications for this encounter.      Current Outpatient Prescriptions   Medication     predniSONE (DELTASONE) 20 MG tablet     albuterol (VENTOLIN HFA) 108 (90 BASE) MCG/ACT Inhaler     Ferrous Sulfate (IRON SUPPLEMENT PO)     fluticasone-salmeterol (ADVAIR DISKUS) 250-50 MCG/DOSE diskus inhaler     guaiFENesin-dextromethorphan (ROBITUSSIN DM) 100-10 MG/5ML syrup     HYDROcodone-acetaminophen (NORCO) 5-325 MG per tablet     ibuprofen (ADVIL/MOTRIN) 600 MG tablet     sertraline (ZOLOFT) 100 MG tablet     sertraline (ZOLOFT) 50 MG tablet     sertraline (ZOLOFT) 50 MG tablet     Spacer/Aero Chamber Mouthpiece MISC      No Known Allergies    Review of Systems   Constitutional: Negative for chills and fever.   HENT: Negative for congestion and trouble swallowing.    Eyes: Negative for redness.   Respiratory: Positive for cough (dry), chest tightness and shortness of breath.    Cardiovascular: Negative for chest pain.   Gastrointestinal: Positive for nausea. Negative for abdominal pain and vomiting.   Genitourinary: Negative for difficulty urinating.   Musculoskeletal: Negative for arthralgias and neck stiffness.   Skin: Negative for color change.   Neurological: Negative for headaches.   Psychiatric/Behavioral: Negative for confusion.   All other systems  "reviewed and are negative.      Physical Exam   BP: 154/79  Pulse: 85  Temp: 98.1  F (36.7  C)  Resp: 18  Height: 175.3 cm (5' 9\")  Weight: 63.5 kg (140 lb)  SpO2: 95 %      Physical Exam  Physical Exam   Constitutional:   well nourished, well developed, resting comfortably   HENT:   Head: Normocephalic and atraumatic.   Eyes: Conjunctivae are normal. Pupils are equal, round, and reactive to light.   TMS are clear, pharynx has no erythema or exudate, mucous membranes are moist  Neck:   no adenopathy, no bony tenderness  Cardiovascular: regular rate and rhythm without murmurs or gallops  Pulmonary/Chest: Clear to auscultation bilaterally, with no wheezes or retractions. No respiratory distress.  GI: Soft with good bowel sounds.  Non-tender, non-distended, with no guarding, no rebound, no peritoneal signs.   Back:  No bony or CVA tenderness   Musculoskeletal:  no edema or clubbing   Skin: Skin is warm and dry. No rash noted.   Neurological: alert and oriented to person, place, and time. Nonfocal exam  Psychiatric:  normal mood and affect.    ED Course     ED Course     Procedures       8:37 PM  The patient was seen and examined by Dr. Guy in Room 23.       Labs Ordered and Resulted from Time of ED Arrival Up to the Time of Departure from the ED - No data to display         Assessments & Plan (with Medical Decision Making)   Ana Bright is a 18 year old female with a history of asthma with reported bronchial spasms who presents for evaluation of chest tightness, cough, and shortness of breath.   Patient complains over the past two days she has had a dry cough consistent with prior episodes of bronchial spasms she has had in the past.  Patient took her inhalers at home but states that normally her symptoms improved with a short course of steroids.  Upon arrival patient is well-appearing, afebrile, no respiratory distress.  Lungs clear to auscultation bilaterally with no wheezing, rhonchi, rales.  The entire " clinical picture is consistent with bronchospasm, given patient's history.  Will treat with prednisone, DuoNeb in the ER and reevaluate.  At this time no signs of acute infection that would require imaging.  On reevaluation patient feeling better with improvement in her symptoms.  We will send her home with a 4-day course of prednisone, continue inhalers at home, recommend close follow-up with primary care physician, and return precautions discussed if high fever, shortness of breath, chest pain, nursing or symptoms.  Patient understands agrees with plan. .The patient is discharged home with instructions to return if their symptoms persist or worsen.  Plan for close follow-up with their primary physician.  I discussed workup, results, treatment, and plan with the patient.  Patient understands and agrees with the plan.      I have reviewed the nursing notes.    I have reviewed the findings, diagnosis, plan and need for follow up with the patient.    Discharge Medication List as of 10/25/2018  9:50 PM          Final diagnoses:   Acute bronchospasm   Cough   Dyspnea, unspecified type   I, Evie Masterson, am serving as a trained medical scribe to document services personally performed by Kristine Guy MD, based on the provider's statements to me.   I, Kristine Guy MD, was physically present and have reviewed and verified the accuracy of this note documented by Evie Masterson.      10/25/2018   [facility], [place], EMERGENCY DEPARTMENT     Kristine Guy MD  10/25/18 [id number]

## 2018-10-26 NOTE — ED TRIAGE NOTES
Patient presents to triage c/o SOB starting approximately one hour ago. She has been experiencing a dry cough for three days. Denies chest pain but does have some chest tightness. She took took her Advair, albuterol, and a nebulizer at home prior to arrival. Oxygen saturation 94-95% on room air at triage.

## 2018-11-07 ENCOUNTER — OFFICE VISIT (OUTPATIENT)
Dept: ORTHOPEDICS | Facility: CLINIC | Age: 18
End: 2018-11-07
Payer: COMMERCIAL

## 2018-11-07 VITALS
DIASTOLIC BLOOD PRESSURE: 77 MMHG | HEART RATE: 66 BPM | BODY MASS INDEX: 20.9 KG/M2 | SYSTOLIC BLOOD PRESSURE: 120 MMHG | HEIGHT: 70 IN | WEIGHT: 146 LBS

## 2018-11-07 DIAGNOSIS — J45.31 MILD PERSISTENT ASTHMA WITH EXACERBATION: ICD-10-CM

## 2018-11-07 NOTE — PROGRESS NOTES
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 Since our last visit on 10/24/18, her sx have completely resolved. She has tolerated a return to play protocol over the last 5 days and is ready for clearance. ATC has been performing myofascial work on neck which has significantly helped symptoms.  Denies headache, dizziness or other sx. and school going well without current accomodations.          Medications:     Current Outpatient Prescriptions   Medication Sig     albuterol (VENTOLIN HFA) 108 (90 BASE) MCG/ACT Inhaler Inhale 2 puffs into the lungs every 6 hours     Ferrous Sulfate (IRON SUPPLEMENT PO)      fluticasone-salmeterol (ADVAIR DISKUS) 250-50 MCG/DOSE diskus inhaler Inhale 1 puff into the lungs every 12 hours     guaiFENesin-dextromethorphan (ROBITUSSIN DM) 100-10 MG/5ML syrup Take 5 mLs by mouth every 4 hours as needed for cough (Patient not taking: Reported on 3/7/2017)     HYDROcodone-acetaminophen (NORCO) 5-325 MG per tablet Take 1 tablet by mouth every 6 hours as needed for severe pain     ibuprofen (ADVIL/MOTRIN) 600 MG tablet Take 1 tablet (600 mg) by mouth every 8 hours as needed for moderate pain     sertraline (ZOLOFT) 100 MG tablet Take 1 tablet (100 mg) by mouth daily     sertraline (ZOLOFT) 50 MG tablet Take 2 tablets (100 mg) by mouth daily     sertraline (ZOLOFT) 50 MG tablet Take 1 tablet (50 mg) by mouth daily (Patient not taking: Reported on 8/6/2018)     Spacer/Aero Chamber Mouthpiece MISC One spacer for use with inhaled asthma MDI     No current facility-administered medications for this visit.              Allergies:    No Known Allergies         Review of Systems:   A comprehensive 10 point review of systems (constitutional, ENT, cardiac,  "peripheral vascular, respiratory, GI, , Musculoskeletal, skin, Neurological) was performed and found to be negative except as described in this note.           Physical Exam:   COMPLETE EXAMINATION:   VITAL SIGNS: /77  Pulse 66  Ht 1.778 m (5' 10\")  Wt 66.2 kg (146 lb)  BMI 20.95 kg/m2  GEN: Alert, well-nourished, and in no distress.   HEENT:   Head: Normocephalic and atraumatic.   Eyes: PERRLA, EOMI  Ears: TM's normal, external canals normal  Mouth/Throat: MMM, No erythema or exudate.   Neck: supple, no lymphadenopathy  CV: S1S2 normal, RRR, no murmur  CHEST: CTA, Easy effort, No rales or wheezes  ABD: Soft. Nontender/nondistended, no HSM/mass, no rebound/guarding.  SKIN: No rash, warmth or erythema  PSYCH: Mood, memory, affect and judgment normal.   MSK: Neck: supple, normal ROM, no TTP along spinous processes, + ttp along hypertonic upper trapezius, forward head posture and anteriorly rotated shoulders  NEURO: Alert and oriented to person, place, and time. Normal reflexes. No cranial nerve deficit. Gait normal. Coordination normal. Balance normal    Nystagmus: No  Coordination:             Finger to Nose: normal                                              Heel to Shin: normal                                              Rapid Alternating Movements: normal  Balance Testing: Rhomberg:normal                                                  Forward Tandem: normal  Advanced Balance Testing:                                               Backward Tandem: not tested                                              Single leg Balance with simultaneous cognitive test :normal  Painful Eye movements: No  Convergence Testing: Normal (</= 6 cm)  Visual Field Testing: normal  Neuro vestibular testing: Head Still eyes move side to side: no nystagmus, no headache, no dizziness and no nausea  Head still eyes move up and down: no nystagmus, no headache, no dizziness and no nausea  Eyes fixed head moves side to side: no " nystagmus, no headache, no dizziness and no nausea  Eyes fixed, head moves up and down: no nystagmus, no headache, no dizziness and no nausea                                                                      GAIT: Walk in hallway at normal speed: Able       Impact Testing Scores: not yet performed          Assessment:     Resolved concussion  Mild persistent asthma exacerbation resolved        Plan:     Cleared to return to full sport.  Continue myofascial work to neck with ATC, attention to posture.  Recommended Advair 250-50 1 puff BID throughout the school year to better manage asthma and prevent further need for oral steroids.  Continue prn Albuterol.  Follow up prn.    Em Green

## 2018-11-07 NOTE — LETTER
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 Since our last visit on 10/24/18, her sx have completely resolved. She has tolerated a return to play protocol over the last 5 days and is ready for clearance. ATC has been performing myofascial work on neck which has significantly helped symptoms.  Denies headache, dizziness or other sx. and school going well without current accomodations.          Medications:     Current Outpatient Prescriptions   Medication Sig     albuterol (VENTOLIN HFA) 108 (90 BASE) MCG/ACT Inhaler Inhale 2 puffs into the lungs every 6 hours     Ferrous Sulfate (IRON SUPPLEMENT PO)      fluticasone-salmeterol (ADVAIR DISKUS) 250-50 MCG/DOSE diskus inhaler Inhale 1 puff into the lungs every 12 hours     guaiFENesin-dextromethorphan (ROBITUSSIN DM) 100-10 MG/5ML syrup Take 5 mLs by mouth every 4 hours as needed for cough (Patient not taking: Reported on 3/7/2017)     HYDROcodone-acetaminophen (NORCO) 5-325 MG per tablet Take 1 tablet by mouth every 6 hours as needed for severe pain     ibuprofen (ADVIL/MOTRIN) 600 MG tablet Take 1 tablet (600 mg) by mouth every 8 hours as needed for moderate pain     sertraline (ZOLOFT) 100 MG tablet Take 1 tablet (100 mg) by mouth daily     sertraline (ZOLOFT) 50 MG tablet Take 2 tablets (100 mg) by mouth daily     sertraline (ZOLOFT) 50 MG tablet Take 1 tablet (50 mg) by mouth daily (Patient not taking: Reported on 8/6/2018)     Spacer/Aero Chamber Mouthpiece MISC One spacer for use with inhaled asthma MDI     No current facility-administered medications for this visit.              Allergies:    No Known Allergies         Review of  "Systems:   A comprehensive 10 point review of systems (constitutional, ENT, cardiac, peripheral vascular, respiratory, GI, , Musculoskeletal, skin, Neurological) was performed and found to be negative except as described in this note.           Physical Exam:   COMPLETE EXAMINATION:   VITAL SIGNS: /77  Pulse 66  Ht 1.778 m (5' 10\")  Wt 66.2 kg (146 lb)  BMI 20.95 kg/m2  GEN: Alert, well-nourished, and in no distress.   HEENT:   Head: Normocephalic and atraumatic.   Eyes: PERRLA, EOMI  Ears: TM's normal, external canals normal  Mouth/Throat: MMM, No erythema or exudate.   Neck: supple, no lymphadenopathy  CV: S1S2 normal, RRR, no murmur  CHEST: CTA, Easy effort, No rales or wheezes  ABD: Soft. Nontender/nondistended, no HSM/mass, no rebound/guarding.  SKIN: No rash, warmth or erythema  PSYCH: Mood, memory, affect and judgment normal.   MSK: Neck: supple, normal ROM, no TTP along spinous processes, + ttp along hypertonic upper trapezius, forward head posture and anteriorly rotated shoulders  NEURO: Alert and oriented to person, place, and time. Normal reflexes. No cranial nerve deficit. Gait normal. Coordination normal. Balance normal    Nystagmus: No  Coordination:             Finger to Nose: normal                                              Heel to Shin: normal                                              Rapid Alternating Movements: normal  Balance Testing: Rhomberg:normal                                                  Forward Tandem: normal  Advanced Balance Testing:                                               Backward Tandem: not tested                                              Single leg Balance with simultaneous cognitive test :normal  Painful Eye movements: No  Convergence Testing: Normal (</= 6 cm)  Visual Field Testing: normal  Neuro vestibular testing: Head Still eyes move side to side: no nystagmus, no headache, no dizziness and no nausea  Head still eyes move up and down: no " nystagmus, no headache, no dizziness and no nausea  Eyes fixed head moves side to side: no nystagmus, no headache, no dizziness and no nausea  Eyes fixed, head moves up and down: no nystagmus, no headache, no dizziness and no nausea                                                                      GAIT: Walk in hallway at normal speed: Able       Impact Testing Scores: not yet performed          Assessment:     Resolved concussion  Mild persistent asthma exacerbation resolved        Plan:     Cleared to return to full sport.  Continue myofascial work to neck with ATC, attention to posture.  Recommended Advair 250-50 1 puff BID throughout the school year to better manage asthma and prevent further need for oral steroids.  Continue prn Albuterol.  Follow up prn.    Em Green MD

## 2018-11-07 NOTE — LETTER
Date:November 8, 2018      Patient was self referred, no letter generated. Do not send.        [facility] Information

## 2018-11-07 NOTE — MR AVS SNAPSHOT
"              After Visit Summary   2018    Ana Bright    MRN: [id number]           Patient Information     Date Of Birth          2000        Visit Information        Provider Department      2018 1:15 PM Em Green MD [facility]        Today's Diagnoses     Mild persistent asthma with exacerbation           Follow-ups after your visit        Who to contact     Please call your clinic at [phone] to:    Ask questions about your health    Make or cancel appointments    Discuss your medicines    Learn about your test results    Speak to your doctor            Additional Information About Your Visit        MyChart Information     [employer] is an electronic gateway that provides easy, online access to your medical records. With [employer], you can request a clinic appointment, read your test results, renew a prescription or communicate with your care team.     To sign up for [employer] visit the website at [email or web address]   You will be asked to enter the access code listed below, as well as some personal information. Please follow the directions to create your username and password.     Your access code is: [email or web address]  Expires: 2019  3:09 PM     Your access code will  in 90 days. If you need help or a new code, please contact your [facility] or call [phone] for assistance.        Care EveryWhere ID     This is your Care EveryWhere ID. This could be used by other organizations to access your [facility] medical records  [id number]        Your Vitals Were     Pulse Height BMI (Body Mass Index)             66 1.778 m (5' 10\") 20.95 kg/m2          Blood Pressure from Last 3 Encounters:   18 120/77   10/25/18 (!) 138/99   10/24/18 112/74    Weight from Last 3 Encounters:   18 66.2 kg (146 lb)   10/25/18 63.5 kg (140 lb)   10/24/18 66.5 kg (146 lb 9.6 oz)              Today, you had the following     No " orders found for display         Where to get your medicines      These medications were sent to [facility] - [place], [place] - [address]  [address] [facility] [address]     Phone:  [phone]     fluticasone-salmeterol 250-50 MCG/DOSE diskus inhaler          Primary Care Provider Fax #    Physician No Ref-Primary [phone]       No address on file        Equal Access to Services     MICHAEL MORENO : Hadii aad ku hadasho Soomaali, waaxda luqadaha, qaybta kaalmada adeegyada, waxay idiin hayaan ademartina khfranchescash laandrew conklin. So [facility] [phone].    ATENCIÓN: Si habla espjacquelin, tiene a diaz disposición servicios gratuitos de asistencia lingüística. Jamiame al [phone].    We comply with applicable federal civil rights laws and [place] laws. We do not discriminate on the basis of race, color, national origin, age, disability, sex, sexual orientation, or gender identity.            Thank you!     Thank you for choosing [facility]  for your care. Our goal is always to provide you with excellent care. Hearing back from our patients is one way we can continue to improve our services. Please take a few minutes to complete the written survey that you may receive in the mail after your visit with us. Thank you!             Your Updated Medication List - Protect others around you: Learn how to safely use, store and throw away your medicines at www.disposemymeds.org.          This list is accurate as of 11/7/18  3:09 PM.  Always use your most recent med list.                   Brand Name Dispense Instructions for use Diagnosis    albuterol 108 (90 Base) MCG/ACT inhaler    VENTOLIN HFA    1 Inhaler    Inhale 2 puffs into the lungs every 6 hours    Moderate asthma with exacerbation, unspecified whether persistent       fluticasone-salmeterol 250-50 MCG/DOSE diskus inhaler    ADVAIR DISKUS    1 Inhaler    Inhale 1 puff into the lungs every 12 hours    Mild persistent asthma with  exacerbation       guaiFENesin-dextromethorphan 100-10 MG/5ML syrup    ROBITUSSIN DM    560 mL    Take 5 mLs by mouth every 4 hours as needed for cough    Exercise-induced asthma, Acute bronchitis, unspecified organism       HYDROcodone-acetaminophen 5-325 MG per tablet    NORCO    10 tablet    Take 1 tablet by mouth every 6 hours as needed for severe pain        ibuprofen 600 MG tablet    ADVIL/MOTRIN    30 tablet    Take 1 tablet (600 mg) by mouth every 8 hours as needed for moderate pain        IRON SUPPLEMENT PO           * sertraline 50 MG tablet    ZOLOFT    30 tablet    Take 1 tablet (50 mg) by mouth daily    Adjustment disorder with depressed mood       * sertraline 100 MG tablet    ZOLOFT    30 tablet    Take 1 tablet (100 mg) by mouth daily    Adjustment disorder with depressed mood       * sertraline 50 MG tablet    ZOLOFT    60 tablet    Take 2 tablets (100 mg) by mouth daily    ROSENDO (generalized anxiety disorder)       Spacer/Aero Chamber Mouthpiece Misc     1 each    One spacer for use with inhaled asthma MDI    Mild intermittent asthma with exacerbation       * Notice:  This list has 3 medication(s) that are the same as other medications prescribed for you. Read the directions carefully, and ask your doctor or other care provider to review them with you.

## 2019-03-06 ENCOUNTER — OFFICE VISIT (OUTPATIENT)
Dept: ORTHOPEDICS | Facility: CLINIC | Age: 19
End: 2019-03-06
Payer: COMMERCIAL

## 2019-03-06 VITALS
HEART RATE: 75 BPM | DIASTOLIC BLOOD PRESSURE: 83 MMHG | HEIGHT: 70 IN | WEIGHT: 142 LBS | SYSTOLIC BLOOD PRESSURE: 126 MMHG | BODY MASS INDEX: 20.33 KG/M2

## 2019-03-06 DIAGNOSIS — M79.671 RIGHT FOOT PAIN: Primary | ICD-10-CM

## 2019-03-06 ASSESSMENT — MIFFLIN-ST. JEOR: SCORE: 1489.36

## 2019-03-06 NOTE — LETTER
Date:March 8, 2019      Patient was self referred, no letter generated. Do not send.        [facility] Health Information

## 2019-03-06 NOTE — LETTER
3/6/2019      RE: Ana Bright  [address] [place] [address]       [facility] FOLLOW UP    Ana Bright MRN# [id number]   Age: 19 year old YOB: 2000           Chief Complaint:     Right foot pain          History of Present Illness:     20 yo [employer] has had right foot pain for the last 4 weeks. No injury. Localizes pain over the 2nd and 3rd metatarsal shafts. No swelling. More painful to run, feels better in CAM boot. Saw chiro who thought she had some tight peroneals and was working on that, but pain persists.     She has a history of several prior stress fractures including spondylolysis, tibia and metatarsals. Periods were normal before she had IUD placed in May, but does have history of amenorrhea and had 10# weight loss last winter. Previously saw Dr. Moreno and some screening labs and DEXA were normal. Worked with sports psych and nutritionist and was doing better, but still says she always has some body image concerns.          Medications:     Current Outpatient Medications   Medication Sig     albuterol (VENTOLIN HFA) 108 (90 BASE) MCG/ACT Inhaler Inhale 2 puffs into the lungs every 6 hours     Ferrous Sulfate (IRON SUPPLEMENT PO)      fluticasone-salmeterol (ADVAIR DISKUS) 250-50 MCG/DOSE diskus inhaler Inhale 1 puff into the lungs every 12 hours     guaiFENesin-dextromethorphan (ROBITUSSIN DM) 100-10 MG/5ML syrup Take 5 mLs by mouth every 4 hours as needed for cough (Patient not taking: Reported on 3/7/2017)     HYDROcodone-acetaminophen (NORCO) 5-325 MG per tablet Take 1 tablet by mouth every 6 hours as needed for severe pain     ibuprofen (ADVIL/MOTRIN) 600 MG tablet Take 1 tablet (600 mg) by mouth every 8 hours as needed for moderate pain     sertraline (ZOLOFT) 100 MG tablet Take 1 tablet (100 mg) by mouth daily     sertraline (ZOLOFT) 50 MG tablet Take 2 tablets (100 mg) by mouth daily     sertraline (ZOLOFT) 50 MG tablet Take 1 tablet (50 mg) by  "mouth daily (Patient not taking: Reported on 8/6/2018)     Spacer/Aero Chamber Mouthpiece MISC One spacer for use with inhaled asthma MDI     No current facility-administered medications for this visit.              Allergies:    No Known Allergies         Review of Systems:   A comprehensive 10 point review of systems (constitutional, ENT, cardiac, peripheral vascular, respiratory, GI, , Musculoskeletal, skin, Neurological) was performed and found to be negative except as described in this note.           Physical Exam:   COMPLETE EXAMINATION:   VITAL SIGNS: /83   Pulse 75   Ht 1.778 m (5' 10\")   Wt 64.4 kg (142 lb)   BMI 20.37 kg/m     GEN: Alert, well-nourished, and in no distress.   NEURO: Distal neurovascular exam normal  SKIN: No rash, warmth or erythema  PSYCH: Mood, memory, affect and judgment normal.   MSK: Right foot: TTp over 2nd and 3rd distal MT shafts, pain with single leg hop, normal ankle and hindfoot ROM; no TTP over peroneals and no pain with resisted foot eversion        Assessment:     Right 2nd and 3rd Metatarsal pain - R/O stress fracture        Plan:     MRI right foot  Continue CAM boot  No impact activity for now    Em Cook was present for the entire visit.      Em Green MD    "

## 2019-03-07 ENCOUNTER — ANCILLARY PROCEDURE (OUTPATIENT)
Dept: MRI IMAGING | Facility: CLINIC | Age: 19
End: 2019-03-07
Attending: PEDIATRICS
Payer: COMMERCIAL

## 2019-03-07 DIAGNOSIS — M79.671 RIGHT FOOT PAIN: ICD-10-CM

## 2019-03-07 NOTE — PROGRESS NOTES
[facility] FOLLOW UP    Ana Bright MRN# [id number]   Age: 19 year old YOB: 2000           Chief Complaint:     Right foot pain          History of Present Illness:     20 yo [employer] has had right foot pain for the last 4 weeks. No injury. Localizes pain over the 2nd and 3rd metatarsal shafts. No swelling. More painful to run, feels better in CAM boot. Saw chiro who thought she had some tight peroneals and was working on that, but pain persists.     She has a history of several prior stress fractures including spondylolysis, tibia and metatarsals. Periods were normal before she had IUD placed in May, but does have history of amenorrhea and had 10# weight loss last winter. Previously saw Dr. Moreno and some screening labs and DEXA were normal. Worked with sports psych and nutritionist and was doing better, but still says she always has some body image concerns.          Medications:     Current Outpatient Medications   Medication Sig     albuterol (VENTOLIN HFA) 108 (90 BASE) MCG/ACT Inhaler Inhale 2 puffs into the lungs every 6 hours     Ferrous Sulfate (IRON SUPPLEMENT PO)      fluticasone-salmeterol (ADVAIR DISKUS) 250-50 MCG/DOSE diskus inhaler Inhale 1 puff into the lungs every 12 hours     guaiFENesin-dextromethorphan (ROBITUSSIN DM) 100-10 MG/5ML syrup Take 5 mLs by mouth every 4 hours as needed for cough (Patient not taking: Reported on 3/7/2017)     HYDROcodone-acetaminophen (NORCO) 5-325 MG per tablet Take 1 tablet by mouth every 6 hours as needed for severe pain     ibuprofen (ADVIL/MOTRIN) 600 MG tablet Take 1 tablet (600 mg) by mouth every 8 hours as needed for moderate pain     sertraline (ZOLOFT) 100 MG tablet Take 1 tablet (100 mg) by mouth daily     sertraline (ZOLOFT) 50 MG tablet Take 2 tablets (100 mg) by mouth daily     sertraline (ZOLOFT) 50 MG tablet Take 1 tablet (50 mg) by mouth daily (Patient not taking: Reported on 8/6/2018)     Spacer/Aero Chamber Mouthpiece  "MISC One spacer for use with inhaled asthma MDI     No current facility-administered medications for this visit.              Allergies:    No Known Allergies         Review of Systems:   A comprehensive 10 point review of systems (constitutional, ENT, cardiac, peripheral vascular, respiratory, GI, , Musculoskeletal, skin, Neurological) was performed and found to be negative except as described in this note.           Physical Exam:   COMPLETE EXAMINATION:   VITAL SIGNS: /83   Pulse 75   Ht 1.778 m (5' 10\")   Wt 64.4 kg (142 lb)   BMI 20.37 kg/m    GEN: Alert, well-nourished, and in no distress.   NEURO: Distal neurovascular exam normal  SKIN: No rash, warmth or erythema  PSYCH: Mood, memory, affect and judgment normal.   MSK: Right foot: TTp over 2nd and 3rd distal MT shafts, pain with single leg hop, normal ankle and hindfoot ROM; no TTP over peroneals and no pain with resisted foot eversion        Assessment:     Right 2nd and 3rd Metatarsal pain - R/O stress fracture        Plan:     MRI right foot  Continue CAM boot  No impact activity for now    Em Cook was present for the entire visit.    "

## 2019-04-08 ENCOUNTER — ANCILLARY PROCEDURE (OUTPATIENT)
Dept: MRI IMAGING | Facility: CLINIC | Age: 19
End: 2019-04-08
Attending: PEDIATRICS
Payer: COMMERCIAL

## 2019-04-08 DIAGNOSIS — M25.579 PAIN IN JOINT, ANKLE AND FOOT: ICD-10-CM

## 2019-04-08 DIAGNOSIS — M25.579 PAIN IN JOINT, ANKLE AND FOOT: Primary | ICD-10-CM

## 2019-04-10 ENCOUNTER — OFFICE VISIT (OUTPATIENT)
Dept: ORTHOPEDICS | Facility: CLINIC | Age: 19
End: 2019-04-10
Payer: COMMERCIAL

## 2019-04-10 VITALS
BODY MASS INDEX: 20.76 KG/M2 | DIASTOLIC BLOOD PRESSURE: 82 MMHG | WEIGHT: 145 LBS | HEIGHT: 70 IN | HEART RATE: 96 BPM | SYSTOLIC BLOOD PRESSURE: 118 MMHG

## 2019-04-10 DIAGNOSIS — M84.30XA STRESS REACTION OF BONE: ICD-10-CM

## 2019-04-10 DIAGNOSIS — N91.5 OLIGOMENORRHEA, UNSPECIFIED TYPE: ICD-10-CM

## 2019-04-10 DIAGNOSIS — M84.374D STRESS FRACTURE OF METATARSAL BONE OF RIGHT FOOT WITH ROUTINE HEALING, SUBSEQUENT ENCOUNTER: ICD-10-CM

## 2019-04-10 DIAGNOSIS — M72.2 PLANTAR FASCIITIS: Primary | ICD-10-CM

## 2019-04-10 DIAGNOSIS — M65.972 TENOSYNOVITIS OF LEFT ANKLE: ICD-10-CM

## 2019-04-10 ASSESSMENT — MIFFLIN-ST. JEOR: SCORE: 1502.97

## 2019-04-10 NOTE — PROGRESS NOTES
[facility] FOLLOW UP    Ana Bright MRN# [id number]   Age: 19 year old YOB: 2000           Chief Complaint:     Left foot pain          History of Present Illness:     20 yo Stephen Strange has had left foot pain localized over plantar heel for the last month. Previously seen for right metatarsal stress reaction which has healed after immobilization and rest. Has more left foot pain with impact and worse with first morning steps. After events, whole ankle will be painful. MRI obtained and will be reviewed below. Has been in CAM boot which has helped.    Had previous DEXA last year which was normal and limited labs which were normal. Irregular periods with current IUD in place. Denies disordered eating or body image concerns.          Medications:     Current Outpatient Medications   Medication Sig     albuterol (VENTOLIN HFA) 108 (90 BASE) MCG/ACT Inhaler Inhale 2 puffs into the lungs every 6 hours     Ferrous Sulfate (IRON SUPPLEMENT PO)      fluticasone-salmeterol (ADVAIR DISKUS) 250-50 MCG/DOSE diskus inhaler Inhale 1 puff into the lungs every 12 hours     guaiFENesin-dextromethorphan (ROBITUSSIN DM) 100-10 MG/5ML syrup Take 5 mLs by mouth every 4 hours as needed for cough (Patient not taking: Reported on 3/7/2017)     HYDROcodone-acetaminophen (NORCO) 5-325 MG per tablet Take 1 tablet by mouth every 6 hours as needed for severe pain     ibuprofen (ADVIL/MOTRIN) 600 MG tablet Take 1 tablet (600 mg) by mouth every 8 hours as needed for moderate pain     sertraline (ZOLOFT) 100 MG tablet Take 1 tablet (100 mg) by mouth daily     sertraline (ZOLOFT) 50 MG tablet Take 2 tablets (100 mg) by mouth daily     sertraline (ZOLOFT) 50 MG tablet Take 1 tablet (50 mg) by mouth daily (Patient not taking: Reported on 8/6/2018)     Spacer/Aero Chamber Mouthpiece MISC One spacer for use with inhaled asthma MDI     No current facility-administered medications for this visit.              Allergies:    No  "Known Allergies         Review of Systems:   A comprehensive 10 point review of systems (constitutional, ENT, cardiac, peripheral vascular, respiratory, GI, , Musculoskeletal, skin, Neurological) was performed and found to be negative except as described in this note.           Physical Exam:   COMPLETE EXAMINATION:   VITAL SIGNS: /82   Pulse 96   Ht 1.778 m (5' 10\")   Wt 65.8 kg (145 lb)   BMI 20.81 kg/m    GEN: Alert, well-nourished, and in no distress.   NEURO: Alert and oriented to person, place, and time. No cranial nerve deficit.   SKIN: No rash, warmth or erythema  PSYCH: Mood, memory, affect and judgment normal.   MSK: Right foot: no TTP   Left foot and ankle: TTP over medial-anterior calcaneal tubercle and subtalar joint and distal posterior tibialis, full ROM, mild pain with resisted foot inversion; no obvious effusion         Imaging:     MRI Left Ankle:  1. Deep to the marker there is edema in the subcutaneous plantar  tissues and mild thickening of the plantar fascia consistent with  plantar fasciitis.   2. Tibialis posterior tendinosis with tenosynovitis.   3. Thickening and altered signal of the superficial and deep portions  of the deltoid ligamentous complex as well as the spring ligamentous  complex likely reflective of remote injury. There is also signal  alteration consistent with old injury involving the anterior  talofibular ligament.   4. Peroneus longus and brevis tendinosis.  5. Mild edema this in the medial aspect of the talus consistent with  stress reaction.    There is also moderate posterior subtalar joint fluid.         Assessment:     1. Left plantar fasciitis  2. Posterior tibialis tenosynovitis  3. Subtalar joint effusion  4. Recurrent stress reactions (previous right MT stress rxn, bilateral tibia stress rxn, now left talus stress reaction)  5. Oligomenorrhea        Plan:     1. Discussed MRI findings and all treatment options with patient.  2. Recommended CAM boot " until she can WB comfortably in a good supportive shoe. Discussed activity modifications avoiding impact.  3. LE rehab.  4. Recommended bone health lab screening. If abnormal, consider repeating DEXA (previously normal).   5. Follow up in 4 weeks. If sx fail to improve and localizing more over subtalar joint, consider steroid injection here.    VERNON Cook was present for the entire visit.

## 2019-04-10 NOTE — LETTER
Date:April 11, 2019      Patient was self referred, no letter generated. Do not send.        [facility] Health Information

## 2019-04-10 NOTE — LETTER
4/10/2019      RE: Ana Bright  [address]  [place] [address]       [facility] FOLLOW UP    Ana Bright MRN# [id number]   Age: 19 year old YOB: 2000           Chief Complaint:     Left foot pain          History of Present Illness:     20 yo Stephen Strange has had left foot pain localized over plantar heel for the last month. Previously seen for right metatarsal stress reaction which has healed after immobilization and rest. Has more left foot pain with impact and worse with first morning steps. After events, whole ankle will be painful. MRI obtained and will be reviewed below. Has been in CAM boot which has helped.    Had previous DEXA last year which was normal and limited labs which were normal. Irregular periods with current IUD in place. Denies disordered eating or body image concerns.          Medications:     Current Outpatient Medications   Medication Sig     albuterol (VENTOLIN HFA) 108 (90 BASE) MCG/ACT Inhaler Inhale 2 puffs into the lungs every 6 hours     Ferrous Sulfate (IRON SUPPLEMENT PO)      fluticasone-salmeterol (ADVAIR DISKUS) 250-50 MCG/DOSE diskus inhaler Inhale 1 puff into the lungs every 12 hours     guaiFENesin-dextromethorphan (ROBITUSSIN DM) 100-10 MG/5ML syrup Take 5 mLs by mouth every 4 hours as needed for cough (Patient not taking: Reported on 3/7/2017)     HYDROcodone-acetaminophen (NORCO) 5-325 MG per tablet Take 1 tablet by mouth every 6 hours as needed for severe pain     ibuprofen (ADVIL/MOTRIN) 600 MG tablet Take 1 tablet (600 mg) by mouth every 8 hours as needed for moderate pain     sertraline (ZOLOFT) 100 MG tablet Take 1 tablet (100 mg) by mouth daily     sertraline (ZOLOFT) 50 MG tablet Take 2 tablets (100 mg) by mouth daily     sertraline (ZOLOFT) 50 MG tablet Take 1 tablet (50 mg) by mouth daily (Patient not taking: Reported on 8/6/2018)     Spacer/Aero Chamber Mouthpiece MISC One spacer for use with inhaled asthma MDI     No  "current facility-administered medications for this visit.              Allergies:    No Known Allergies         Review of Systems:   A comprehensive 10 point review of systems (constitutional, ENT, cardiac, peripheral vascular, respiratory, GI, , Musculoskeletal, skin, Neurological) was performed and found to be negative except as described in this note.           Physical Exam:   COMPLETE EXAMINATION:   VITAL SIGNS: /82   Pulse 96   Ht 1.778 m (5' 10\")   Wt 65.8 kg (145 lb)   BMI 20.81 kg/m     GEN: Alert, well-nourished, and in no distress.   NEURO: Alert and oriented to person, place, and time. No cranial nerve deficit.   SKIN: No rash, warmth or erythema  PSYCH: Mood, memory, affect and judgment normal.   MSK: Right foot: no TTP   Left foot and ankle: TTP over medial-anterior calcaneal tubercle and subtalar joint and distal posterior tibialis, full ROM, mild pain with resisted foot inversion; no obvious effusion         Imaging:     MRI Left Ankle:  1. Deep to the marker there is edema in the subcutaneous plantar  tissues and mild thickening of the plantar fascia consistent with  plantar fasciitis.   2. Tibialis posterior tendinosis with tenosynovitis.   3. Thickening and altered signal of the superficial and deep portions  of the deltoid ligamentous complex as well as the spring ligamentous  complex likely reflective of remote injury. There is also signal  alteration consistent with old injury involving the anterior  talofibular ligament.   4. Peroneus longus and brevis tendinosis.  5. Mild edema this in the medial aspect of the talus consistent with  stress reaction.    There is also moderate posterior subtalar joint fluid.         Assessment:     1. Left plantar fasciitis  2. Posterior tibialis tenosynovitis  3. Subtalar joint effusion  4. Recurrent stress reactions (previous right MT stress rxn, bilateral tibia stress rxn, now left talus stress reaction)  5. Oligomenorrhea        Plan:     1. " Discussed MRI findings and all treatment options with patient.  2. Recommended CAM boot until she can WB comfortably in a good supportive shoe. Discussed activity modifications avoiding impact.  3. LE rehab.  4. Recommended bone health lab screening. If abnormal, consider repeating DEXA (previously normal).   5. Follow up in 4 weeks. If sx fail to improve and localizing more over subtalar joint, consider steroid injection here.    VERNON Cook was present for the entire visit.      Em Green MD

## 2019-11-01 DIAGNOSIS — R53.83 FATIGUE, UNSPECIFIED TYPE: Primary | ICD-10-CM

## 2020-01-06 DIAGNOSIS — R53.83 FATIGUE, UNSPECIFIED TYPE: ICD-10-CM

## 2020-01-06 LAB
FERRITIN SERPL-MCNC: 80 NG/ML (ref 12–150)
HGB BLD-MCNC: 14.2 G/DL (ref 11.7–15.7)

## 2020-02-05 ENCOUNTER — OFFICE VISIT (OUTPATIENT)
Dept: ORTHOPEDICS | Facility: CLINIC | Age: 20
End: 2020-02-05
Payer: COMMERCIAL

## 2020-02-05 VITALS
HEART RATE: 57 BPM | SYSTOLIC BLOOD PRESSURE: 115 MMHG | BODY MASS INDEX: 20.36 KG/M2 | WEIGHT: 142.2 LBS | HEIGHT: 70 IN | DIASTOLIC BLOOD PRESSURE: 83 MMHG

## 2020-02-05 DIAGNOSIS — M65.839 INTERSECTION SYNDROME OF WRIST: Primary | ICD-10-CM

## 2020-02-05 DIAGNOSIS — N91.5 OLIGOMENORRHEA, UNSPECIFIED TYPE: ICD-10-CM

## 2020-02-05 RX ORDER — MELOXICAM 15 MG/1
15 TABLET ORAL DAILY
Qty: 30 TABLET | Refills: 0 | Status: SHIPPED | OUTPATIENT
Start: 2020-02-05

## 2020-02-05 ASSESSMENT — MIFFLIN-ST. JEOR: SCORE: 1490.26

## 2020-02-05 NOTE — LETTER
Date:February 6, 2020      Patient was self referred, no letter generated. Do not send.        [facility] Health Information

## 2020-02-05 NOTE — LETTER
2/5/2020      RE: Ana Bright  [address]  [place] [address]       [facility] CLINIC FOLLOW UP    Ana Bright MRN# [id number]   Age: 19 year old YOB: 2000           Chief Complaint:     1. Left wrist pain  2. 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 No injury or obvious repetitive activities other than weight lifting for training. She is RHD. Felt like s/t hit forearm, but no bruise. Couldn't push grocery cart due to pain. Next day noticed area of swelling over the area (distal forearm) and it was making a squeaking sound with wrist ROM. Seen at [place] and given wrist brace which has been helping.    Last visit was in April. Never had follow up or labs obtained then. However, she has been doing well, pain free and no new stress reactions. Periods occur monthly even while IUD in place. Denies body image concerns now. Weaned off Zoloft Jefe time 2018. Mood good, no anxiety. No longer seeing sports psych. Track going really well. Trying to attend to nutrition and does well during week when she has access to cafeteria, but does not have a lot of food at home as cannot afford much. Parents came in town this weekend and she was able to go shopping and is better stocked now.          Medications:     Current Outpatient Medications   Medication Sig     albuterol (VENTOLIN HFA) 108 (90 BASE) MCG/ACT Inhaler Inhale 2 puffs into the lungs every 6 hours (Patient not taking: Reported on 2/5/2020)     Ferrous Sulfate (IRON SUPPLEMENT PO)      fluticasone-salmeterol (ADVAIR DISKUS) 250-50 MCG/DOSE diskus inhaler Inhale 1 puff into the lungs every 12 hours (Patient not taking: Reported on 2/5/2020)     guaiFENesin-dextromethorphan (ROBITUSSIN DM) 100-10 MG/5ML syrup Take 5 mLs by mouth every 4 hours as needed for cough (Patient not taking: Reported on 3/7/2017)      "HYDROcodone-acetaminophen (NORCO) 5-325 MG per tablet Take 1 tablet by mouth every 6 hours as needed for severe pain (Patient not taking: Reported on 2/5/2020)     ibuprofen (ADVIL/MOTRIN) 600 MG tablet Take 1 tablet (600 mg) by mouth every 8 hours as needed for moderate pain (Patient not taking: Reported on 2/5/2020)     sertraline (ZOLOFT) 100 MG tablet Take 1 tablet (100 mg) by mouth daily (Patient not taking: Reported on 2/5/2020)     sertraline (ZOLOFT) 50 MG tablet Take 2 tablets (100 mg) by mouth daily (Patient not taking: Reported on 2/5/2020)     sertraline (ZOLOFT) 50 MG tablet Take 1 tablet (50 mg) by mouth daily (Patient not taking: Reported on 8/6/2018)     Spacer/Aero Chamber Mouthpiece MISC One spacer for use with inhaled asthma MDI (Patient not taking: Reported on 2/5/2020)     No current facility-administered medications for this visit.              Allergies:    No Known Allergies         Review of Systems:   A comprehensive 10 point review of systems (constitutional, ENT, cardiac, peripheral vascular, respiratory, GI, , Musculoskeletal, skin, Neurological) was performed and found to be negative except as described in this note.           Physical Exam:   COMPLETE EXAMINATION:   VITAL SIGNS: /83   Pulse 57   Ht 1.778 m (5' 10\")   Wt 64.5 kg (142 lb 3.2 oz)   LMP 01/19/2020 (Exact Date)   BMI 20.40 kg/m     GEN: Alert, well-nourished, and in no distress.   NEURO: Alert and oriented to person, place, and time. Gait normal. Distal NV exam normal  SKIN: No rash, warmth or erythema  PSYCH: Mood, memory, affect and judgment normal.   MSK: left forearm: mild swelling over distal wrist at level of extensor tendon intersection. With wrist flexion/extension, there is palpable crepitus and TTP here. FROM at wrist and elbow.        Assessment:     Left wrist intersection syndrome  H/O oligomenorrhea, now with regular periods on IUD         Plan:     1. Wrist brace, ice, meloxicam and activity " modifications for next 2 weeks.  2. If sx persist, US guided steroid injection.  3. Ensure adequate nutrition.    Em Green M.D.    VERNON Mclean was present for the entire visit.      Em Green MD

## 2020-08-26 DIAGNOSIS — Z11.59 SPECIAL SCREENING EXAMINATION FOR VIRAL DISEASE: Primary | ICD-10-CM

## 2020-08-28 DIAGNOSIS — Z11.59 SPECIAL SCREENING EXAMINATION FOR VIRAL DISEASE: ICD-10-CM

## 2020-08-29 LAB
SARS-COV-2 RNA SPEC QL NAA+PROBE: NOT DETECTED
SPECIMEN SOURCE: NORMAL

## 2020-08-30 DIAGNOSIS — Z11.59 SPECIAL SCREENING EXAMINATION FOR VIRAL DISEASE: ICD-10-CM

## 2020-08-31 LAB
COVID-19 ANTIBODY IGG: NEGATIVE
LAB TEST METHOD: NORMAL

## 2020-09-01 LAB
SARS-COV-2 RNA SPEC QL NAA+PROBE: NOT DETECTED
SPECIMEN SOURCE: NORMAL

## 2020-09-22 DIAGNOSIS — Z11.59 SPECIAL SCREENING EXAMINATION FOR VIRAL DISEASE: Primary | ICD-10-CM

## 2020-09-23 DIAGNOSIS — Z11.59 SPECIAL SCREENING EXAMINATION FOR VIRAL DISEASE: ICD-10-CM

## 2020-09-24 LAB
SARS-COV-2 RNA SPEC QL NAA+PROBE: NOT DETECTED
SPECIMEN SOURCE: NORMAL

## 2020-09-28 DIAGNOSIS — Z11.59 SPECIAL SCREENING EXAMINATION FOR VIRAL DISEASE: ICD-10-CM

## 2020-09-29 LAB
SARS-COV-2 RNA SPEC QL NAA+PROBE: NOT DETECTED
SPECIMEN SOURCE: NORMAL

## 2020-10-06 DIAGNOSIS — J45.31 MILD PERSISTENT ASTHMA WITH EXACERBATION: Primary | ICD-10-CM

## 2020-10-06 RX ORDER — ALBUTEROL SULFATE 2.5 MG/.5ML
1 SOLUTION RESPIRATORY (INHALATION) EVERY 4 HOURS PRN
Qty: 1 BOTTLE | Refills: 3 | Status: SHIPPED | OUTPATIENT
Start: 2020-10-06

## 2020-10-13 DIAGNOSIS — Z11.59 SPECIAL SCREENING EXAMINATION FOR VIRAL DISEASE: Primary | ICD-10-CM

## 2020-10-14 DIAGNOSIS — Z11.59 SPECIAL SCREENING EXAMINATION FOR VIRAL DISEASE: ICD-10-CM

## 2020-10-14 LAB
SARS-COV-2 RNA SPEC QL NAA+PROBE: NOT DETECTED
SPECIMEN SOURCE: NORMAL

## 2020-10-14 PROCEDURE — 99000 SPECIMEN HANDLING OFFICE-LAB: CPT | Performed by: PATHOLOGY

## 2020-10-14 PROCEDURE — U0003 INFECTIOUS AGENT DETECTION BY NUCLEIC ACID (DNA OR RNA); SEVERE ACUTE RESPIRATORY SYNDROME CORONAVIRUS 2 (SARS-COV-2) (CORONAVIRUS DISEASE [COVID-19]), AMPLIFIED PROBE TECHNIQUE, MAKING USE OF HIGH THROUGHPUT TECHNOLOGIES AS DESCRIBED BY CMS-2020-01-R: HCPCS | Mod: 90 | Performed by: PATHOLOGY

## 2020-12-09 ENCOUNTER — OFFICE VISIT (OUTPATIENT)
Dept: ORTHOPEDICS | Facility: CLINIC | Age: 20
End: 2020-12-09
Payer: COMMERCIAL

## 2020-12-09 VITALS
WEIGHT: 146 LBS | DIASTOLIC BLOOD PRESSURE: 79 MMHG | HEART RATE: 83 BPM | HEIGHT: 70 IN | SYSTOLIC BLOOD PRESSURE: 124 MMHG | BODY MASS INDEX: 20.9 KG/M2

## 2020-12-09 DIAGNOSIS — S06.0X0A CONCUSSION WITHOUT LOSS OF CONSCIOUSNESS, INITIAL ENCOUNTER: Primary | ICD-10-CM

## 2020-12-09 ASSESSMENT — MIFFLIN-ST. JEOR: SCORE: 1502.5

## 2020-12-09 NOTE — LETTER
12/9/2020      RE: Ana Bright  [address] [place] [address]       [facility] CONCUSSION FOLLOW UP    Ana Bright MRN# [id number]   Age: 20 year old YOB: 2000           Chief Complaint:     Follow up concussion          History of Present Illness:     Ana Bright is here for f/u of concussion sustained on 12/7/20 during a high jump. She was attempting to jump higher than she ever has and as she was landing felt like her head whipped back. When she stood up she felt dizzy with some slurred speech and headache. Couldn't do school work that night. Yesterday felt much better, no dizziness, just mild headache. Today, has no symptoms.  Feels back to normal self.  She has had 2 prior concussions most recently in 2018 and this one feels much milder.         Medications:     Current Outpatient Medications   Medication Sig     albuterol (VENTOLIN HFA) 108 (90 BASE) MCG/ACT Inhaler Inhale 2 puffs into the lungs every 6 hours (Patient not taking: Reported on 2/5/2020)     Albuterol Sulfate 2.5 MG/0.5ML NEBU Inhale 1 Dose into the lungs every 4 hours as needed (cough)     Ferrous Sulfate (IRON SUPPLEMENT PO)      fluticasone-salmeterol (ADVAIR DISKUS) 250-50 MCG/DOSE diskus inhaler Inhale 1 puff into the lungs every 12 hours (Patient not taking: Reported on 2/5/2020)     guaiFENesin-dextromethorphan (ROBITUSSIN DM) 100-10 MG/5ML syrup Take 5 mLs by mouth every 4 hours as needed for cough (Patient not taking: Reported on 3/7/2017)     HYDROcodone-acetaminophen (NORCO) 5-325 MG per tablet Take 1 tablet by mouth every 6 hours as needed for severe pain (Patient not taking: Reported on 2/5/2020)     ibuprofen (ADVIL/MOTRIN) 600 MG tablet Take 1 tablet (600 mg) by mouth every 8 hours as needed for moderate pain (Patient not taking: Reported on 2/5/2020)     meloxicam (MOBIC) 15 MG tablet Take 1 tablet (15 mg) by mouth daily     Spacer/Aero Chamber Mouthpiece MISC One spacer for use with  "inhaled asthma MDI (Patient not taking: Reported on 2020)     No current facility-administered medications for this visit.              Allergies:    No Known Allergies         Review of Systems:   A comprehensive 10 point review of systems (constitutional, ENT, cardiac, peripheral vascular, respiratory, GI, , Musculoskeletal, skin, Neurological) was performed and found to be negative except as described in this note.           Physical Exam:   COMPLETE EXAMINATION:   VITAL SIGNS: /79   Pulse 83   Ht 1.778 m (5' 10\")   Wt 66.2 kg (146 lb)   BMI 20.95 kg/m    GEN: alert, NAD  HEENT:   Head: Normocephalic and atraumatic.   Eyes: PERRLA, EOMI, Nystagmus: no; Painful Eye movements: no  Mouth/Throat: MMM, No erythema or exudate.   Neck:  Supple, no TTP, FROM  CV: S1S2 normal, RRR, no murmur  CHEST: CTA, Easy effort, No rales or wheezes  ABD: Soft. Nontender/nondistended, no HSM/mass, no rebound/guarding.  SKIN: No rash, warmth or erythema  PSYCH:normal affect, mood  NEURO: normal  Gait: normal  Coordination:  Finger to Nose: normal    Rapid Alternating Movements:normal  Balance Testing:         Double leg stance eyes closed: 0 error                  Single leg stance eyes closed: 1 error (improved from 2 days ago per ATC)        Feet Tandem eyes closed: 0 errors  Convergence Testin cm    VOMS: Smooth Pursuit:normal    Saccades horizontal: normal    Saccades vertical: normal    VORx1: normal    VOR cancellation: normal      ImPACT Testing Scores pending           Assessment:     Concussion, resolved        Plan:     1. Ana may start advancing through the [facility] Concussion protocol and after passing all levels, athlete may return to full activity and sports participation without restriction.  2. Follow up prn    ATC Solange Mclean was present for the entire visit.          Em Green MD    "

## 2020-12-09 NOTE — PROGRESS NOTES
[facility] CONCUSSION FOLLOW UP    Ana Bright MRN# [id number]   Age: 20 year old YOB: 2000           Chief Complaint:     Follow up concussion          History of Present Illness:     Ana Bright is here for f/u of concussion sustained on 12/7/20 during a high jump. She was attempting to jump higher than she ever has and as she was landing felt like her head whipped back. When she stood up she felt dizzy with some slurred speech and headache. Couldn't do school work that night. Yesterday felt much better, no dizziness, just mild headache. Today, has no symptoms.  Feels back to normal self.  She has had 2 prior concussions most recently in 2018 and this one feels much milder.         Medications:     Current Outpatient Medications   Medication Sig     albuterol (VENTOLIN HFA) 108 (90 BASE) MCG/ACT Inhaler Inhale 2 puffs into the lungs every 6 hours (Patient not taking: Reported on 2/5/2020)     Albuterol Sulfate 2.5 MG/0.5ML NEBU Inhale 1 Dose into the lungs every 4 hours as needed (cough)     Ferrous Sulfate (IRON SUPPLEMENT PO)      fluticasone-salmeterol (ADVAIR DISKUS) 250-50 MCG/DOSE diskus inhaler Inhale 1 puff into the lungs every 12 hours (Patient not taking: Reported on 2/5/2020)     guaiFENesin-dextromethorphan (ROBITUSSIN DM) 100-10 MG/5ML syrup Take 5 mLs by mouth every 4 hours as needed for cough (Patient not taking: Reported on 3/7/2017)     HYDROcodone-acetaminophen (NORCO) 5-325 MG per tablet Take 1 tablet by mouth every 6 hours as needed for severe pain (Patient not taking: Reported on 2/5/2020)     ibuprofen (ADVIL/MOTRIN) 600 MG tablet Take 1 tablet (600 mg) by mouth every 8 hours as needed for moderate pain (Patient not taking: Reported on 2/5/2020)     meloxicam (MOBIC) 15 MG tablet Take 1 tablet (15 mg) by mouth daily     Spacer/Aero Chamber Mouthpiece MISC One spacer for use with inhaled asthma MDI (Patient not taking: Reported on 2/5/2020)     No current  "facility-administered medications for this visit.              Allergies:    No Known Allergies         Review of Systems:   A comprehensive 10 point review of systems (constitutional, ENT, cardiac, peripheral vascular, respiratory, GI, , Musculoskeletal, skin, Neurological) was performed and found to be negative except as described in this note.           Physical Exam:   COMPLETE EXAMINATION:   VITAL SIGNS: /79   Pulse 83   Ht 1.778 m (5' 10\")   Wt 66.2 kg (146 lb)   BMI 20.95 kg/m    GEN: alert, NAD  HEENT:   Head: Normocephalic and atraumatic.   Eyes: PERRLA, EOMI, Nystagmus: no; Painful Eye movements: no  Mouth/Throat: MMM, No erythema or exudate.   Neck:  Supple, no TTP, FROM  CV: S1S2 normal, RRR, no murmur  CHEST: CTA, Easy effort, No rales or wheezes  ABD: Soft. Nontender/nondistended, no HSM/mass, no rebound/guarding.  SKIN: No rash, warmth or erythema  PSYCH:normal affect, mood  NEURO: normal  Gait: normal  Coordination:  Finger to Nose: normal    Rapid Alternating Movements:normal  Balance Testing:         Double leg stance eyes closed: 0 error                  Single leg stance eyes closed: 1 error (improved from 2 days ago per ATC)        Feet Tandem eyes closed: 0 errors  Convergence Testin cm    VOMS: Smooth Pursuit:normal    Saccades horizontal: normal    Saccades vertical: normal    VORx1: normal    VOR cancellation: normal      ImPACT Testing Scores pending           Assessment:     Concussion, resolved        Plan:     1. Ana may start advancing through the [employer] Concussion protocol and after passing all levels, athlete may return to full activity and sports participation without restriction.  2. Follow up prn    ATC Solange Mclean was present for the entire visit.      "

## 2020-12-09 NOTE — LETTER
Date:December 10, 2020      Patient was self referred, no letter generated. Do not send.        [facility] Health Information

## 2021-01-11 NOTE — PROGRESS NOTES
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  States she has a history of exercise induced asthma that worsens when the seasons change.  She reports increase in dry cough, congestion.  Symptoms are worse during the night.  She has to sleep on a 45 degree angle due to cough.  Denies any fevers or chills.  No sick contacts.      PAST MEDICAL, SOCIAL, SURGICAL AND FAMILY HISTORY: She  has a past medical history of Uncomplicated asthma. She also has no past medical history of Arthritis; Bleeding disorder (H); Cancer (H); Cerebral infarction (H); Chronic kidney disease; Degenerative joint disease; Diabetes (H); Heart disease; Hepatitis; Hypertension; or Surgical complication.  She  has no past surgical history on file.  Her family history includes DIABETES in her maternal grandfather; Hyperlipidemia in her maternal grandmother and mother; Hypertension in her maternal grandfather and maternal grandmother.  She reports that she has never smoked. She does not have any smokeless tobacco history on file. She reports that she does not drink alcohol or use illicit drugs.      ALLERGIES: She has No Known Allergies.    CURRENT MEDICATIONS: She has a current medication list which includes the following prescription(s): ferrous sulfate and guaifenesin-dextromethorphan.     REVIEW OF SYSTEMS:  CONSTITUTIONAL:NEGATIVE for fever, chills, change in weight  INTEGUMENTARY/SKIN: NEGATIVE for worrisome rashes, moles or lesions  MUSCULOSKELETAL:NEGATIVE for joint pain  NEURO: NEGATIVE for weakness, dizziness or paresthesias      EXAM:  /78  Pulse 96  Ht 5' 10\" (1.778 m)  Wt 147 lb (66.7 kg)  LMP 02/07/2017 (Exact Date)  BMI 21.09 kg/m2  CONSTITUTIONIAL: healthy, alert and no distress  HEENT: NC/AT, no scleral icteris  SKIN: no suspicious lesions or rashes  GAIT: normal  CARDIO: no LE edema  LUNGS: breathing non labored  ABD: soft, non obese  NEUROLOGIC: No focal neuro " ----- Message from Jesús Ha MD sent at 1/11/2021 10:23 AM EST -----  I refilled her Wellbutrin but is listed as an allergy. Can you check and see if she is able to take it or not. If she is to take Wellbutrin out of the allergy list. If she cannot take it call pharmacy and cancel script. deficits  PSYCHIATRIC: affect normal/bright and mentation appears normal.        ASSESSMENT/PLAN:  Asthma exacerbation  -prednisone 40mg daily x 7 days  -claritin daily  -albuterol prn    F/u if symptoms worsen or do not improve    Patient discussed with attending physician, Dr. Moreno.    Zenaida Guerrero DO  Primary Care Sports Medicine Fellow

## 2021-05-07 ENCOUNTER — ANCILLARY PROCEDURE (OUTPATIENT)
Dept: GENERAL RADIOLOGY | Facility: CLINIC | Age: 21
End: 2021-05-07
Attending: PREVENTIVE MEDICINE
Payer: COMMERCIAL

## 2021-05-07 ENCOUNTER — OFFICE VISIT (OUTPATIENT)
Dept: FAMILY MEDICINE | Facility: CLINIC | Age: 21
End: 2021-05-07
Payer: COMMERCIAL

## 2021-05-07 VITALS
HEIGHT: 70 IN | SYSTOLIC BLOOD PRESSURE: 125 MMHG | WEIGHT: 143 LBS | HEART RATE: 91 BPM | BODY MASS INDEX: 20.47 KG/M2 | DIASTOLIC BLOOD PRESSURE: 74 MMHG

## 2021-05-07 DIAGNOSIS — S90.32XA CONTUSION OF HEEL, LEFT, INITIAL ENCOUNTER: ICD-10-CM

## 2021-05-07 DIAGNOSIS — G89.29 HEEL PAIN, CHRONIC, LEFT: Primary | ICD-10-CM

## 2021-05-07 DIAGNOSIS — M79.672 HEEL PAIN, CHRONIC, LEFT: ICD-10-CM

## 2021-05-07 DIAGNOSIS — G89.29 HEEL PAIN, CHRONIC, LEFT: ICD-10-CM

## 2021-05-07 DIAGNOSIS — M79.672 HEEL PAIN, CHRONIC, LEFT: Primary | ICD-10-CM

## 2021-05-07 PROCEDURE — 73630 X-RAY EXAM OF FOOT: CPT | Mod: LT | Performed by: RADIOLOGY

## 2021-05-07 ASSESSMENT — MIFFLIN-ST. JEOR: SCORE: 1483.89

## 2021-05-07 NOTE — LETTER
5/7/2021      RE: Ana [address] [place] [address]       HISTORY OF PRESENT ILLNESS  Ms. Bright is a pleasant 21 year old year old female who presents to clinic today with left heel  Ana explains that she injured this during an event about 6 weeks ago  Did low impact and hydrotherapy /workouts on/off since then  Had events last weekend and had more pain afterwards  Location: left heel  Quality:  achy pain    Severity: 5/10 at worst    Duration: 6 weeks  Timing: occurs intermittently  Context: occurs while exercising and lifting  Modifying factors:  resting and non-use makes it better, movement and use makes it worse  Associated signs & symptoms: bruising previously    MEDICAL HISTORY  There is no problem list on file for this patient.      Current Outpatient Medications   Medication Sig Dispense Refill     albuterol (VENTOLIN HFA) 108 (90 BASE) MCG/ACT Inhaler Inhale 2 puffs into the lungs every 6 hours (Patient not taking: Reported on 2/5/2020) 1 Inhaler 3     Albuterol Sulfate 2.5 MG/0.5ML NEBU Inhale 1 Dose into the lungs every 4 hours as needed (cough) (Patient not taking: Reported on 5/7/2021) 1 Bottle 3     Ferrous Sulfate (IRON SUPPLEMENT PO)        fluticasone-salmeterol (ADVAIR DISKUS) 250-50 MCG/DOSE diskus inhaler Inhale 1 puff into the lungs every 12 hours (Patient not taking: Reported on 2/5/2020) 1 Inhaler 0     guaiFENesin-dextromethorphan (ROBITUSSIN DM) 100-10 MG/5ML syrup Take 5 mLs by mouth every 4 hours as needed for cough (Patient not taking: Reported on 3/7/2017) 560 mL 1     HYDROcodone-acetaminophen (NORCO) 5-325 MG per tablet Take 1 tablet by mouth every 6 hours as needed for severe pain (Patient not taking: Reported on 2/5/2020) 10 tablet 0     ibuprofen (ADVIL/MOTRIN) 600 MG tablet Take 1 tablet (600 mg) by mouth every 8 hours as needed for moderate pain (Patient not taking: Reported on 2/5/2020) 30 tablet 0     meloxicam (MOBIC) 15 MG tablet Take 1 tablet  (15 mg) by mouth daily 30 tablet 0     Spacer/Aero Chamber Mouthpiece MISC One spacer for use with inhaled asthma MDI (Patient not taking: Reported on 2/5/2020) 1 each 0       No Known Allergies    Family History   Problem Relation Age of Onset     Hyperlipidemia Mother      Hypertension Maternal Grandmother      Hyperlipidemia Maternal Grandmother      Diabetes Maternal Grandfather      Hypertension Maternal Grandfather      Social History     Socioeconomic History     Marital status: Single     Spouse name: None     Number of children: None     Years of education: None     Highest education level: None   Occupational History     None   Social Needs     Financial resource strain: None     Food insecurity     Worry: None     Inability: None     Transportation needs     Medical: None     Non-medical: None   Tobacco Use     Smoking status: Never Smoker     Smokeless tobacco: Never Used   Substance and Sexual Activity     Alcohol use: No     Drug use: No     Sexual activity: Not Currently     Partners: Male     Birth control/protection: I.U.D.   Lifestyle     Physical activity     Days per week: None     Minutes per session: None     Stress: None   Relationships     Social connections     Talks on phone: None     Gets together: None     Attends [employer] service: None     Active member of club or organization: None     Attends meetings of clubs or organizations: None     Relationship status: None     Intimate partner violence     Fear of current or ex partner: None     Emotionally abused: None     Physically abused: None     Forced sexual activity: None   Other Topics Concern     None   Social History Narrative     None       Additional medical/Social/Surgical histories reviewed in EPIC and updated as appropriate.     REVIEW OF SYSTEMS (5/7/2021)  10 point ROS of systems including Constitutional, Eyes, Respiratory, Cardiovascular, Gastroenterology, Genitourinary, Integumentary, Musculoskeletal, Psychiatric,  "Allergic/Immunologic were all negative except for pertinent positives noted in my HPI.     PHYSICAL EXAM  Vitals:    05/07/21 0834   BP: 125/74   Pulse: 91   Weight: 64.9 kg (143 lb)   Height: 1.778 m (5' 10\")     Vital Signs: /74   Pulse 91   Ht 1.778 m (5' 10\")   Wt 64.9 kg (143 lb)   BMI 20.52 kg/m   Patient declined being weighed. Body mass index is 20.52 kg/m .    General  - normal appearance, in no obvious distress  HEENT  - conjunctivae not injected, moist mucous membranes, normocephalic/atraumatic head, ears normal appearance, no lesions, mouth normal appearance, no scars, normal dentition and teeth present  CV  - normal pulses at posterior tib and dorsalis pedis  Pulm  - normal respiratory pattern, non-labored  Musculoskeletal - left foot  - stance: normal gait without limp, normal stance without excessive pronation, normal heel inversion with standing heel raise, no obvious leg length discrepancy, normal heel and toe walk  - inspection: no swelling or effusion,  normal bone and joint alignment, no obvious deformity  - palpation: no bony or soft tissue tenderness,except to lateral calcaneus and aTFL area  - ROM: normal active and passive ROM of great and lesser toes, no pain with MT translation  - strength: 5/5 in all planes  Neuro  - no sensory or motor deficit, grossly normal coordination, normal muscle tone  Skin  - no ecchymosis, erythema, warmth, or induration, no obvious rash  Psych  - interactive, appropriate, normal mood and affect  ASSESSMENT & PLAN  20 yo female with left heel pain due to foot sprain vs. Contusion vs. Stress reaction  Discussed and ordered foot xray  Encouraged tylenol and use of walking boot  Low impact training for now  F/u after xray  Discussed plan with ATC and athlete    I independently reviewed the following imaging studies:    Appropriate PPE was utilized for prevention of spread of Covid-19.  Bubba Ferreira MD, CAQSM      "

## 2021-05-07 NOTE — PROGRESS NOTES
HISTORY OF PRESENT ILLNESS  Ms. Bright is a pleasant 21 year old year old female who presents to clinic today with left heel  Ana explains that she injured this during an event about 6 weeks ago  Did low impact and hydrotherapy /workouts on/off since then  Had events last weekend and had more pain afterwards  Location: left heel  Quality:  achy pain    Severity: 5/10 at worst    Duration: 6 weeks  Timing: occurs intermittently  Context: occurs while exercising and lifting  Modifying factors:  resting and non-use makes it better, movement and use makes it worse  Associated signs & symptoms: bruising previously    MEDICAL HISTORY  There is no problem list on file for this patient.      Current Outpatient Medications   Medication Sig Dispense Refill     albuterol (VENTOLIN HFA) 108 (90 BASE) MCG/ACT Inhaler Inhale 2 puffs into the lungs every 6 hours (Patient not taking: Reported on 2/5/2020) 1 Inhaler 3     Albuterol Sulfate 2.5 MG/0.5ML NEBU Inhale 1 Dose into the lungs every 4 hours as needed (cough) (Patient not taking: Reported on 5/7/2021) 1 Bottle 3     Ferrous Sulfate (IRON SUPPLEMENT PO)        fluticasone-salmeterol (ADVAIR DISKUS) 250-50 MCG/DOSE diskus inhaler Inhale 1 puff into the lungs every 12 hours (Patient not taking: Reported on 2/5/2020) 1 Inhaler 0     guaiFENesin-dextromethorphan (ROBITUSSIN DM) 100-10 MG/5ML syrup Take 5 mLs by mouth every 4 hours as needed for cough (Patient not taking: Reported on 3/7/2017) 560 mL 1     HYDROcodone-acetaminophen (NORCO) 5-325 MG per tablet Take 1 tablet by mouth every 6 hours as needed for severe pain (Patient not taking: Reported on 2/5/2020) 10 tablet 0     ibuprofen (ADVIL/MOTRIN) 600 MG tablet Take 1 tablet (600 mg) by mouth every 8 hours as needed for moderate pain (Patient not taking: Reported on 2/5/2020) 30 tablet 0     meloxicam (MOBIC) 15 MG tablet Take 1 tablet (15 mg) by mouth daily 30 tablet 0     Spacer/Aero Chamber Mouthpiece MISC One spacer  for use with inhaled asthma MDI (Patient not taking: Reported on 2/5/2020) 1 each 0       No Known Allergies    Family History   Problem Relation Age of Onset     Hyperlipidemia Mother      Hypertension Maternal Grandmother      Hyperlipidemia Maternal Grandmother      Diabetes Maternal Grandfather      Hypertension Maternal Grandfather      Social History     Socioeconomic History     Marital status: Single     Spouse name: None     Number of children: None     Years of education: None     Highest education level: None   Occupational History     None   Social Needs     Financial resource strain: None     Food insecurity     Worry: None     Inability: None     Transportation needs     Medical: None     Non-medical: None   Tobacco Use     Smoking status: Never Smoker     Smokeless tobacco: Never Used   Substance and Sexual Activity     Alcohol use: No     Drug use: No     Sexual activity: Not Currently     Partners: Male     Birth control/protection: I.U.D.   Lifestyle     Physical activity     Days per week: None     Minutes per session: None     Stress: None   Relationships     Social connections     Talks on phone: None     Gets together: None     Attends Islam service: None     Active member of club or organization: None     Attends meetings of clubs or organizations: None     Relationship status: None     Intimate partner violence     Fear of current or ex partner: None     Emotionally abused: None     Physically abused: None     Forced sexual activity: None   Other Topics Concern     None   Social History Narrative     None       Additional medical/Social/Surgical histories reviewed in [facility] and updated as appropriate.     REVIEW OF SYSTEMS (5/7/2021)  10 point ROS of systems including Constitutional, Eyes, Respiratory, Cardiovascular, Gastroenterology, Genitourinary, Integumentary, Musculoskeletal, Psychiatric, Allergic/Immunologic were all negative except for pertinent positives noted in my HPI.    "  PHYSICAL EXAM  Vitals:    05/07/21 0834   BP: 125/74   Pulse: 91   Weight: 64.9 kg (143 lb)   Height: 1.778 m (5' 10\")     Vital Signs: /74   Pulse 91   Ht 1.778 m (5' 10\")   Wt 64.9 kg (143 lb)   BMI 20.52 kg/m   Patient declined being weighed. Body mass index is 20.52 kg/m .    General  - normal appearance, in no obvious distress  HEENT  - conjunctivae not injected, moist mucous membranes, normocephalic/atraumatic head, ears normal appearance, no lesions, mouth normal appearance, no scars, normal dentition and teeth present  CV  - normal pulses at posterior tib and dorsalis pedis  Pulm  - normal respiratory pattern, non-labored  Musculoskeletal - left foot  - stance: normal gait without limp, normal stance without excessive pronation, normal heel inversion with standing heel raise, no obvious leg length discrepancy, normal heel and toe walk  - inspection: no swelling or effusion,  normal bone and joint alignment, no obvious deformity  - palpation: no bony or soft tissue tenderness,except to lateral calcaneus and aTFL area  - ROM: normal active and passive ROM of great and lesser toes, no pain with MT translation  - strength: 5/5 in all planes  Neuro  - no sensory or motor deficit, grossly normal coordination, normal muscle tone  Skin  - no ecchymosis, erythema, warmth, or induration, no obvious rash  Psych  - interactive, appropriate, normal mood and affect  ASSESSMENT & PLAN  22 yo female with left heel pain due to foot sprain vs. Contusion vs. Stress reaction  Discussed and ordered foot xray  Encouraged tylenol and use of walking boot  Low impact training for now  F/u after xray  Discussed plan with ATC and athlete    I independently reviewed the following imaging studies:    Appropriate PPE was utilized for prevention of spread of Covid-19.  Bubba Ferreira MD, CAQSM    "

## 2021-05-10 ENCOUNTER — OFFICE VISIT (OUTPATIENT)
Dept: FAMILY MEDICINE | Facility: CLINIC | Age: 21
End: 2021-05-10
Payer: COMMERCIAL

## 2021-05-10 VITALS
BODY MASS INDEX: 20.62 KG/M2 | WEIGHT: 144 LBS | DIASTOLIC BLOOD PRESSURE: 82 MMHG | HEIGHT: 70 IN | SYSTOLIC BLOOD PRESSURE: 132 MMHG | HEART RATE: 64 BPM

## 2021-05-10 DIAGNOSIS — G89.29 HEEL PAIN, CHRONIC, LEFT: Primary | ICD-10-CM

## 2021-05-10 DIAGNOSIS — M79.672 HEEL PAIN, CHRONIC, LEFT: Primary | ICD-10-CM

## 2021-05-10 DIAGNOSIS — S90.32XA CONTUSION OF HEEL, LEFT, INITIAL ENCOUNTER: ICD-10-CM

## 2021-05-10 RX ORDER — DICLOFENAC SODIUM 75 MG/1
75 TABLET, DELAYED RELEASE ORAL 2 TIMES DAILY
Qty: 30 TABLET | Refills: 0 | Status: SHIPPED | OUTPATIENT
Start: 2021-05-10

## 2021-05-10 ASSESSMENT — MIFFLIN-ST. JEOR: SCORE: 1488.43

## 2021-05-10 NOTE — PROGRESS NOTES
HISTORY OF PRESENT ILLNESS  Ms. Bright is a pleasant 21 year old year old female who presents to clinic today with ongoing left heel pain  Ana explains that she has used the boot most of the days since I last saw her  She thinks she 'feels about the same'  Doesn't hurt when she walks now, which is improved  Has only been using tylenol    MEDICAL HISTORY  There is no problem list on file for this patient.      Current Outpatient Medications   Medication Sig Dispense Refill     albuterol (VENTOLIN HFA) 108 (90 BASE) MCG/ACT Inhaler Inhale 2 puffs into the lungs every 6 hours (Patient not taking: Reported on 2/5/2020) 1 Inhaler 3     Albuterol Sulfate 2.5 MG/0.5ML NEBU Inhale 1 Dose into the lungs every 4 hours as needed (cough) (Patient not taking: Reported on 5/7/2021) 1 Bottle 3     Ferrous Sulfate (IRON SUPPLEMENT PO)        fluticasone-salmeterol (ADVAIR DISKUS) 250-50 MCG/DOSE diskus inhaler Inhale 1 puff into the lungs every 12 hours (Patient not taking: Reported on 2/5/2020) 1 Inhaler 0     guaiFENesin-dextromethorphan (ROBITUSSIN DM) 100-10 MG/5ML syrup Take 5 mLs by mouth every 4 hours as needed for cough (Patient not taking: Reported on 3/7/2017) 560 mL 1     HYDROcodone-acetaminophen (NORCO) 5-325 MG per tablet Take 1 tablet by mouth every 6 hours as needed for severe pain (Patient not taking: Reported on 2/5/2020) 10 tablet 0     ibuprofen (ADVIL/MOTRIN) 600 MG tablet Take 1 tablet (600 mg) by mouth every 8 hours as needed for moderate pain (Patient not taking: Reported on 2/5/2020) 30 tablet 0     meloxicam (MOBIC) 15 MG tablet Take 1 tablet (15 mg) by mouth daily 30 tablet 0     Spacer/Aero Chamber Mouthpiece MISC One spacer for use with inhaled asthma MDI (Patient not taking: Reported on 2/5/2020) 1 each 0       No Known Allergies    Family History   Problem Relation Age of Onset     Hyperlipidemia Mother      Hypertension Maternal Grandmother      Hyperlipidemia Maternal Grandmother      Diabetes  "Maternal Grandfather      Hypertension Maternal Grandfather      Social History     Socioeconomic History     Marital status: Single     Spouse name: None     Number of children: None     Years of education: None     Highest education level: None   Occupational History     None   Social Needs     Financial resource strain: None     Food insecurity     Worry: None     Inability: None     Transportation needs     Medical: None     Non-medical: None   Tobacco Use     Smoking status: Never Smoker     Smokeless tobacco: Never Used   Substance and Sexual Activity     Alcohol use: No     Drug use: No     Sexual activity: Not Currently     Partners: Male     Birth control/protection: I.U.D.   Lifestyle     Physical activity     Days per week: None     Minutes per session: None     Stress: None   Relationships     Social connections     Talks on phone: None     Gets together: None     Attends Sabian[employer] service: None     Active member of club or organization: None     Attends meetings of clubs or organizations: None     Relationship status: None     Intimate partner violence     Fear of current or ex partner: None     Emotionally abused: None     Physically abused: None     Forced sexual activity: None   Other Topics Concern     None   Social History Narrative     None       Additional medical/Social/Surgical histories reviewed in [facility] and updated as appropriate.     REVIEW OF SYSTEMS (5/10/2021)  10 point ROS of systems including Constitutional, Eyes, Respiratory, Cardiovascular, Gastroenterology, Genitourinary, Integumentary, Musculoskeletal, Psychiatric, Allergic/Immunologic were all negative except for pertinent positives noted in my HPI.     PHYSICAL EXAM  Vitals:    05/10/21 1507   BP: 132/82   Pulse: 64   Weight: 65.3 kg (144 lb)   Height: 1.778 m (5' 10\")     Vital Signs: /82   Pulse 64   Ht 1.778 m (5' 10\")   Wt 65.3 kg (144 lb)   BMI 20.66 kg/m   Patient declined being weighed. Body mass index is 20.66 " kg/m .    General  - normal appearance, in no obvious distress  HEENT  - conjunctivae not injected, moist mucous membranes, normocephalic/atraumatic head, ears normal appearance, no lesions, mouth normal appearance, no scars, normal dentition and teeth present  CV  - normal pulses at posterior tib and dorsalis pedis  Pulm  - normal respiratory pattern, non-labored  Musculoskeletal - left foot  - stance: normal gait without limp, normal stance without excessive pronation, normal heel inversion with standing heel raise, no obvious leg length discrepancy, normal heel and toe walk  - inspection: no swelling or effusion,  normal bone and joint alignment, no obvious deformity  - palpation: no bony or soft tissue tenderness, except when lateral calcaneus is palpated  - ROM: normal active and passive ROM of great and lesser toes, no pain with MT translation  - strength: 5/5 in all planes  Neuro  - no sensory or motor deficit, grossly normal coordination, normal muscle tone  Skin  - no ecchymosis, erythema, warmth, or induration, no obvious rash  Psych  - interactive, appropriate, normal mood and affect  ASSESSMENT & PLAN  22 yo female with calcaneus contusion,   Stable  Reviewed xrays foot independenlty: shows no fractures  Consider CT/MRI if worsens  For now, discussed tylenol use and voltaren bid  Consider toradol on event days next weekend  Will give RX for that if not improving  Discussed risks of competing if painful, and will work closely with ATC if worsens to decide whether to compete, at this point I think if she can tolerate the running and her activities for her events, she can compete but continue to rest and use boot until then  Appropriate PPE was utilized for prevention of spread of Covid-19.  Bubba Ferreira MD, CAQSM

## 2021-05-10 NOTE — LETTER
5/10/2021      RE: Ana Bright  [address] [place] [address]       HISTORY OF PRESENT ILLNESS  Ms. Bright is a pleasant 21 year old year old female who presents to clinic today with ongoing left heel pain  Ana explains that she has used the boot most of the days since I last saw her  She thinks she 'feels about the same'  Doesn't hurt when she walks now, which is improved  Has only been using tylenol    MEDICAL HISTORY  There is no problem list on file for this patient.      Current Outpatient Medications   Medication Sig Dispense Refill     albuterol (VENTOLIN HFA) 108 (90 BASE) MCG/ACT Inhaler Inhale 2 puffs into the lungs every 6 hours (Patient not taking: Reported on 2/5/2020) 1 Inhaler 3     Albuterol Sulfate 2.5 MG/0.5ML NEBU Inhale 1 Dose into the lungs every 4 hours as needed (cough) (Patient not taking: Reported on 5/7/2021) 1 Bottle 3     Ferrous Sulfate (IRON SUPPLEMENT PO)        fluticasone-salmeterol (ADVAIR DISKUS) 250-50 MCG/DOSE diskus inhaler Inhale 1 puff into the lungs every 12 hours (Patient not taking: Reported on 2/5/2020) 1 Inhaler 0     guaiFENesin-dextromethorphan (ROBITUSSIN DM) 100-10 MG/5ML syrup Take 5 mLs by mouth every 4 hours as needed for cough (Patient not taking: Reported on 3/7/2017) 560 mL 1     HYDROcodone-acetaminophen (NORCO) 5-325 MG per tablet Take 1 tablet by mouth every 6 hours as needed for severe pain (Patient not taking: Reported on 2/5/2020) 10 tablet 0     ibuprofen (ADVIL/MOTRIN) 600 MG tablet Take 1 tablet (600 mg) by mouth every 8 hours as needed for moderate pain (Patient not taking: Reported on 2/5/2020) 30 tablet 0     meloxicam (MOBIC) 15 MG tablet Take 1 tablet (15 mg) by mouth daily 30 tablet 0     Spacer/Aero Chamber Mouthpiece MISC One spacer for use with inhaled asthma MDI (Patient not taking: Reported on 2/5/2020) 1 each 0       No Known Allergies    Family History   Problem Relation Age of Onset     Hyperlipidemia Mother       "Hypertension Maternal Grandmother      Hyperlipidemia Maternal Grandmother      Diabetes Maternal Grandfather      Hypertension Maternal Grandfather      Social History     Socioeconomic History     Marital status: Single     Spouse name: None     Number of children: None     Years of education: None     Highest education level: None   Occupational History     None   Social Needs     Financial resource strain: None     Food insecurity     Worry: None     Inability: None     Transportation needs     Medical: None     Non-medical: None   Tobacco Use     Smoking status: Never Smoker     Smokeless tobacco: Never Used   Substance and Sexual Activity     Alcohol use: No     Drug use: No     Sexual activity: Not Currently     Partners: Male     Birth control/protection: I.U.D.   Lifestyle     Physical activity     Days per week: None     Minutes per session: None     Stress: None   Relationships     Social connections     Talks on phone: None     Gets together: None     Attends [employer] service: None     Active member of club or organization: None     Attends meetings of clubs or organizations: None     Relationship status: None     Intimate partner violence     Fear of current or ex partner: None     Emotionally abused: None     Physically abused: None     Forced sexual activity: None   Other Topics Concern     None   Social History Narrative     None       Additional medical/Social/Surgical histories reviewed in [facility] and updated as appropriate.     REVIEW OF SYSTEMS (5/10/2021)  10 point ROS of systems including Constitutional, Eyes, Respiratory, Cardiovascular, Gastroenterology, Genitourinary, Integumentary, Musculoskeletal, Psychiatric, Allergic/Immunologic were all negative except for pertinent positives noted in my HPI.     PHYSICAL EXAM  Vitals:    05/10/21 1507   BP: 132/82   Pulse: 64   Weight: 65.3 kg (144 lb)   Height: 1.778 m (5' 10\")     Vital Signs: /82   Pulse 64   Ht 1.778 m (5' 10\")   Wt 65.3 kg " (144 lb)   BMI 20.66 kg/m   Patient declined being weighed. Body mass index is 20.66 kg/m .    General  - normal appearance, in no obvious distress  HEENT  - conjunctivae not injected, moist mucous membranes, normocephalic/atraumatic head, ears normal appearance, no lesions, mouth normal appearance, no scars, normal dentition and teeth present  CV  - normal pulses at posterior tib and dorsalis pedis  Pulm  - normal respiratory pattern, non-labored  Musculoskeletal - left foot  - stance: normal gait without limp, normal stance without excessive pronation, normal heel inversion with standing heel raise, no obvious leg length discrepancy, normal heel and toe walk  - inspection: no swelling or effusion,  normal bone and joint alignment, no obvious deformity  - palpation: no bony or soft tissue tenderness, except when lateral calcaneus is palpated  - ROM: normal active and passive ROM of great and lesser toes, no pain with MT translation  - strength: 5/5 in all planes  Neuro  - no sensory or motor deficit, grossly normal coordination, normal muscle tone  Skin  - no ecchymosis, erythema, warmth, or induration, no obvious rash  Psych  - interactive, appropriate, normal mood and affect  ASSESSMENT & PLAN  22 yo female with calcaneus contusion,   Stable  Reviewed xrays foot independenlty: shows no fractures  Consider CT/MRI if worsens  For now, discussed tylenol use and voltaren bid  Consider toradol on event days next weekend  Will give RX for that if not improving  Discussed risks of competing if painful, and will work closely with ATC if worsens to decide whether to compete, at this point I think if she can tolerate the running and her activities for her events, she can compete but continue to rest and use boot until then  Appropriate PPE was utilized for prevention of spread of Covid-19.  Bubba Ferreira MD, CAQSM

## 2021-05-11 RX ORDER — KETOROLAC TROMETHAMINE 10 MG/1
10 TABLET, FILM COATED ORAL DAILY PRN
Qty: 3 TABLET | Refills: 0 | Status: SHIPPED | OUTPATIENT
Start: 2021-05-11

## 2024-01-23 NOTE — PROGRESS NOTES
[facility] FOLLOW UP    Ana Bright MRN# [id number]   Age: 19 year old YOB: 2000           Chief Complaint:     1. Left wrist pain  2. 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 No injury or obvious repetitive activities other than weight lifting for training. She is RHD. Felt like s/t hit forearm, but no bruise. Couldn't push grocery cart due to pain. Next day noticed area of swelling over the area (distal forearm) and it was making a squeaking sound with wrist ROM. Seen at [place] and given wrist brace which has been helping.    Last visit was in April. Never had follow up or labs obtained then. However, she has been doing well, pain free and no new stress reactions. Periods occur monthly even while IUD in place. Denies body image concerns now. Weaned off Zoloft Jefe time 2018. Mood good, no anxiety. No longer seeing sports psych. Track going really well. Trying to attend to nutrition and does well during week when she has access to cafeteria, but does not have a lot of food at home as cannot afford much. Parents came in town this weekend and she was able to go shopping and is better stocked now.          Medications:     Current Outpatient Medications   Medication Sig     albuterol (VENTOLIN HFA) 108 (90 BASE) MCG/ACT Inhaler Inhale 2 puffs into the lungs every 6 hours (Patient not taking: Reported on 2/5/2020)     Ferrous Sulfate (IRON SUPPLEMENT PO)      fluticasone-salmeterol (ADVAIR DISKUS) 250-50 MCG/DOSE diskus inhaler Inhale 1 puff into the lungs every 12 hours (Patient not taking: Reported on 2/5/2020)     guaiFENesin-dextromethorphan (ROBITUSSIN DM) 100-10 MG/5ML syrup Take 5 mLs by mouth every 4 hours as needed for cough (Patient not taking: Reported on 3/7/2017)     HYDROcodone-acetaminophen (NORCO) 5-325 MG per tablet Take 1 tablet by mouth every 6 hours as needed for  "severe pain (Patient not taking: Reported on 2/5/2020)     ibuprofen (ADVIL/MOTRIN) 600 MG tablet Take 1 tablet (600 mg) by mouth every 8 hours as needed for moderate pain (Patient not taking: Reported on 2/5/2020)     sertraline (ZOLOFT) 100 MG tablet Take 1 tablet (100 mg) by mouth daily (Patient not taking: Reported on 2/5/2020)     sertraline (ZOLOFT) 50 MG tablet Take 2 tablets (100 mg) by mouth daily (Patient not taking: Reported on 2/5/2020)     sertraline (ZOLOFT) 50 MG tablet Take 1 tablet (50 mg) by mouth daily (Patient not taking: Reported on 8/6/2018)     Spacer/Aero Chamber Mouthpiece MISC One spacer for use with inhaled asthma MDI (Patient not taking: Reported on 2/5/2020)     No current facility-administered medications for this visit.              Allergies:    No Known Allergies         Review of Systems:   A comprehensive 10 point review of systems (constitutional, ENT, cardiac, peripheral vascular, respiratory, GI, , Musculoskeletal, skin, Neurological) was performed and found to be negative except as described in this note.           Physical Exam:   COMPLETE EXAMINATION:   VITAL SIGNS: /83   Pulse 57   Ht 1.778 m (5' 10\")   Wt 64.5 kg (142 lb 3.2 oz)   LMP 01/19/2020 (Exact Date)   BMI 20.40 kg/m    GEN: Alert, well-nourished, and in no distress.   NEURO: Alert and oriented to person, place, and time. Gait normal. Distal NV exam normal  SKIN: No rash, warmth or erythema  PSYCH: Mood, memory, affect and judgment normal.   MSK: left forearm: mild swelling over distal wrist at level of extensor tendon intersection. With wrist flexion/extension, there is palpable crepitus and TTP here. FROM at wrist and elbow.        Assessment:     Left wrist intersection syndrome  H/O oligomenorrhea, now with regular periods on IUD         Plan:     1. Wrist brace, ice, meloxicam and activity modifications for next 2 weeks.  2. If sx persist, US guided steroid injection.  3. Ensure adequate " Detail Level: Detailed Send Procedure Quote As Charge: No nutrition.    Em Green M.D.    VERNON Mclean was present for the entire visit.